# Patient Record
Sex: MALE | Race: WHITE | NOT HISPANIC OR LATINO | Employment: FULL TIME | URBAN - NONMETROPOLITAN AREA
[De-identification: names, ages, dates, MRNs, and addresses within clinical notes are randomized per-mention and may not be internally consistent; named-entity substitution may affect disease eponyms.]

---

## 2017-03-23 ENCOUNTER — OFFICE VISIT (OUTPATIENT)
Dept: RETAIL CLINIC | Facility: CLINIC | Age: 32
End: 2017-03-23

## 2017-03-23 VITALS — HEART RATE: 88 BPM | TEMPERATURE: 98.7 F | WEIGHT: 160.8 LBS | OXYGEN SATURATION: 97 % | RESPIRATION RATE: 20 BRPM

## 2017-03-23 DIAGNOSIS — Z20.828 EXPOSURE TO INFLUENZA: Primary | ICD-10-CM

## 2017-03-23 PROCEDURE — 99213 OFFICE O/P EST LOW 20 MIN: CPT | Performed by: NURSE PRACTITIONER

## 2017-03-23 RX ORDER — DEXTROMETHORPHAN HYDROBROMIDE AND PROMETHAZINE HYDROCHLORIDE 15; 6.25 MG/5ML; MG/5ML
5 SYRUP ORAL 4 TIMES DAILY PRN
Qty: 180 ML | Refills: 0 | Status: SHIPPED | OUTPATIENT
Start: 2017-03-23 | End: 2017-03-28

## 2017-03-23 RX ORDER — OSELTAMIVIR PHOSPHATE 75 MG/1
75 CAPSULE ORAL 2 TIMES DAILY
Qty: 10 CAPSULE | Refills: 0 | Status: SHIPPED | OUTPATIENT
Start: 2017-03-23 | End: 2017-03-28

## 2017-03-23 NOTE — PROGRESS NOTES
"Subjective   Steve Davidson is a 31 y.o. female.     Flu Symptoms   This is a new problem. The current episode started yesterday. The problem occurs constantly. The problem has been rapidly worsening. Associated symptoms include arthralgias, chills, congestion, coughing, fatigue, a fever, headaches, myalgias, nausea and a sore throat. Pertinent negatives include no chest pain, rash or vomiting. The symptoms are aggravated by coughing. She has tried acetaminophen and NSAIDs for the symptoms. The treatment provided no relief.      Pt presents with c/o flu symptoms which started suddenly yesterday and described as feeling like \"hit by truck\".  States 2 of his children tested + for flu this week.  Has been taking otc cold and cough meds with no relief.     No current outpatient prescriptions on file prior to visit.     No current facility-administered medications on file prior to visit.        No Known Allergies    Past Medical History:   Diagnosis Date   • Asthma        History reviewed. No pertinent surgical history.    Family History   Problem Relation Age of Onset   • Lung disease Mother    • Heart disease Father    • Autoimmune disease Other    • Diabetes Other        Social History     Social History   • Marital status: Single     Spouse name: N/A   • Number of children: N/A   • Years of education: N/A     Occupational History   • Not on file.     Social History Main Topics   • Smoking status: Never Smoker   • Smokeless tobacco: Not on file   • Alcohol use Not on file   • Drug use: Not on file   • Sexual activity: Not on file     Other Topics Concern   • Not on file     Social History Narrative   • No narrative on file       Review of Systems   Constitutional: Positive for activity change, appetite change, chills, fatigue and fever.   HENT: Positive for congestion, ear pain, postnasal drip, rhinorrhea, sinus pressure and sore throat. Negative for trouble swallowing.    Respiratory: Positive for cough and shortness of " breath. Negative for wheezing.    Cardiovascular: Negative for chest pain.   Gastrointestinal: Positive for nausea. Negative for diarrhea and vomiting.   Musculoskeletal: Positive for arthralgias, back pain (back hurting from coughing so much) and myalgias.   Skin: Negative for rash.   Neurological: Positive for headaches. Negative for dizziness.       Pulse 88  Temp 98.7 °F (37.1 °C) (Oral)   Resp 20  Wt 160 lb 12.8 oz (72.9 kg)  SpO2 97%    Objective   Physical Exam   Constitutional: She is oriented to person, place, and time. She appears well-developed and well-nourished. She is cooperative. She has a sickly appearance. No distress.   HENT:   Head: Normocephalic and atraumatic.   Right Ear: A middle ear effusion is present.   Left Ear: A middle ear effusion is present.   Nose: Mucosal edema and rhinorrhea present. Right sinus exhibits maxillary sinus tenderness and frontal sinus tenderness. Left sinus exhibits maxillary sinus tenderness and frontal sinus tenderness.   Mouth/Throat: Uvula is midline and mucous membranes are normal. Posterior oropharyngeal edema and posterior oropharyngeal erythema present. No oropharyngeal exudate or tonsillar abscesses. No tonsillar exudate.   Neck: Trachea normal and normal range of motion. Neck supple.   Cardiovascular: Normal rate, regular rhythm, S1 normal, S2 normal and normal heart sounds.    Pulmonary/Chest: Effort normal and breath sounds normal. No respiratory distress. She has no decreased breath sounds. She has no wheezes. She has no rhonchi. She has no rales.   Lymphadenopathy:     She has cervical adenopathy.   Neurological: She is alert and oriented to person, place, and time.   Skin: Skin is warm, dry and intact. No rash noted.       Assessment/Plan   Steve was seen today for flu symptoms.    Diagnoses and all orders for this visit:    Exposure to influenza    Other orders  -     oseltamivir (TAMIFLU) 75 MG capsule; Take 1 capsule by mouth 2 (Two) Times a Day  for 5 days.  -     promethazine-dextromethorphan (PROMETHAZINE-DM) 6.25-15 MG/5ML syrup; Take 5 mL by mouth 4 (Four) Times a Day As Needed for Cough (may take 10 ml if needed) for up to 5 days.    Exam findings and treatment plan discussed with patient who verbalizes understanding.  Summary of visit given to patient.

## 2017-03-23 NOTE — PATIENT INSTRUCTIONS
"Influenza, Adult  Influenza, more commonly known as \"the flu,\" is a viral infection that primarily affects the respiratory tract. The respiratory tract includes organs that help you breathe, such as the lungs, nose, and throat. The flu causes many common cold symptoms, as well as a high fever and body aches.  The flu spreads easily from person to person (is contagious). Getting a flu shot (influenza vaccination) every year is the best way to prevent influenza.  CAUSES  Influenza is caused by a virus. You can catch the virus by:  · Breathing in droplets from an infected person's cough or sneeze.  · Touching something that was recently contaminated with the virus and then touching your mouth, nose, or eyes.  RISK FACTORS  The following factors may make you more likely to get the flu:  · Not cleaning your hands frequently with soap and water or alcohol-based hand .  · Having close contact with many people during cold and flu season.  · Touching your mouth, eyes, or nose without washing or sanitizing your hands first.  · Not drinking enough fluids or not eating a healthy diet.  · Not getting enough sleep or exercise.  · Being under a high amount of stress.  · Not getting a yearly (annual) flu shot.  You may be at a higher risk of complications from the flu, such as a severe lung infection (pneumonia), if you:  · Are over the age of 65.  · Are pregnant.  · Have a weakened disease-fighting system (immune system). You may have a weakened immune system if you:    Have HIV or AIDS.    Are undergoing chemotherapy.    Are taking medicines that reduce the activity of (suppress) the immune system.  · Have a long-term (chronic) illness, such as heart disease, kidney disease, diabetes, or lung disease.  · Have a liver disorder.  · Are obese.  · Have anemia.  SYMPTOMS  Symptoms of this condition typically last 4-10 days and may include:  · Fever.  · Chills.  · Headache, body aches, or muscle aches.  · Sore " throat.  · Cough.  · Runny or congested nose.  · Chest discomfort and cough.  · Poor appetite.  · Weakness or tiredness (fatigue).  · Dizziness.  · Nausea or vomiting.  DIAGNOSIS  This condition may be diagnosed based on your medical history and a physical exam. Your health care provider may do a nose or throat swab test to confirm the diagnosis.  TREATMENT  If influenza is detected early, you can be treated with antiviral medicine that can reduce the length of your illness and the severity of your symptoms. This medicine may be given by mouth (orally) or through an IV tube that is inserted in one of your veins.  The goal of treatment is to relieve symptoms by taking care of yourself at home. This may include taking over-the-counter medicines, drinking plenty of fluids, and adding humidity to the air in your home.  In some cases, influenza goes away on its own. Severe influenza or complications from influenza may be treated in a hospital.  HOME CARE INSTRUCTIONS  · Take over-the-counter and prescription medicines only as told by your health care provider.  · Use a cool mist humidifier to add humidity to the air in your home. This can make breathing easier.  · Rest as needed.  · Drink enough fluid to keep your urine clear or pale yellow.  · Cover your mouth and nose when you cough or sneeze.  · Wash your hands with soap and water often, especially after you cough or sneeze. If soap and water are not available, use hand .  · Stay home from work or school as told by your health care provider. Unless you are visiting your health care provider, try to avoid leaving home until your fever has been gone for 24 hours without the use of medicine.  · Keep all follow-up visits as told by your health care provider. This is important.  PREVENTION  · Getting an annual flu shot is the best way to avoid getting the flu. You may get the flu shot in late summer, fall, or winter. Ask your health care provider when you should  get your flu shot.  · Wash your hands often or use hand  often.  · Avoid contact with people who are sick during cold and flu season.  · Eat a healthy diet, drink plenty of fluids, get enough sleep, and exercise regularly.  SEEK MEDICAL CARE IF:  · You develop new symptoms.  · You have:    Chest pain.    Diarrhea.    A fever.  · Your cough gets worse.  · You produce more mucus.  · You feel nauseous or you vomit.  SEEK IMMEDIATE MEDICAL CARE IF:  · You develop shortness of breath or difficulty breathing.  · Your skin or nails turn a bluish color.  · You have severe pain or stiffness in your neck.  · You develop a sudden headache or sudden pain in your face or ear.  · You cannot stop vomiting.     This information is not intended to replace advice given to you by your health care provider. Make sure you discuss any questions you have with your health care provider.     Document Released: 12/15/2001 Document Revised: 01/13/2017 Document Reviewed: 10/11/2016  [employer] Interactive Patient Education ©2016 [employer]

## 2017-04-10 PROCEDURE — 99283 EMERGENCY DEPT VISIT LOW MDM: CPT

## 2017-04-11 ENCOUNTER — APPOINTMENT (OUTPATIENT)
Dept: GENERAL RADIOLOGY | Facility: HOSPITAL | Age: 32
End: 2017-04-11

## 2017-04-11 ENCOUNTER — HOSPITAL ENCOUNTER (EMERGENCY)
Facility: HOSPITAL | Age: 32
Discharge: HOME | End: 2017-04-11
Attending: EMERGENCY MEDICINE | Admitting: EMERGENCY MEDICINE

## 2017-04-11 VITALS
TEMPERATURE: 98.3 F | SYSTOLIC BLOOD PRESSURE: 125 MMHG | HEART RATE: 98 BPM | HEIGHT: 69 IN | BODY MASS INDEX: 22.96 KG/M2 | DIASTOLIC BLOOD PRESSURE: 78 MMHG | OXYGEN SATURATION: 99 % | WEIGHT: 155 LBS | RESPIRATION RATE: 19 BRPM

## 2017-04-11 DIAGNOSIS — S92.501A: Primary | ICD-10-CM

## 2017-04-11 PROCEDURE — 73660 X-RAY EXAM OF TOE(S): CPT

## 2017-04-11 PROCEDURE — 73660 X-RAY EXAM OF TOE(S): CPT | Performed by: RADIOLOGY

## 2017-04-11 RX ORDER — HYDROCODONE BITARTRATE AND ACETAMINOPHEN 5; 325 MG/1; MG/1
1 TABLET ORAL ONCE
Status: COMPLETED | OUTPATIENT
Start: 2017-04-11 | End: 2017-04-11

## 2017-04-11 RX ORDER — HYDROCODONE BITARTRATE AND ACETAMINOPHEN 5; 325 MG/1; MG/1
1 TABLET ORAL EVERY 6 HOURS PRN
Qty: 6 TABLET | Refills: 0 | Status: SHIPPED | OUTPATIENT
Start: 2017-04-11

## 2017-04-11 RX ADMIN — HYDROCODONE BITARTRATE AND ACETAMINOPHEN 1 TABLET: 5; 325 TABLET ORAL at 01:18

## 2017-04-11 RX ADMIN — HYDROCODONE BITARTRATE AND ACETAMINOPHEN 1 TABLET: 5; 325 TABLET ORAL at 02:13

## 2017-04-11 NOTE — ED PROVIDER NOTES
Subjective   Patient is a 31 y.o. female presenting with lower extremity pain.   History provided by:  Patient   used: No    Lower Extremity Issue   Location:  Toe (right 5th toe)  Time since incident:  4 hours  Injury: yes    Mechanism of injury: fall    Mechanism of injury comment:  Tripped, got pinky toe caught right foot  Fall:     Entrapped after fall: no    Toe location:  R little toe  Associated symptoms: decreased ROM and numbness    Associated symptoms: no back pain, no fever, no itching and no neck pain    Risk factors: no concern for non-accidental trauma and no frequent fractures        Review of Systems   Constitutional: Negative.  Negative for fever.   HENT: Negative.    Eyes: Negative.    Respiratory: Negative.    Cardiovascular: Negative.    Gastrointestinal: Negative.    Endocrine: Negative.    Genitourinary: Negative.    Musculoskeletal: Positive for gait problem. Negative for back pain and neck pain.   Skin: Negative.  Negative for itching.   Allergic/Immunologic: Negative.    Hematological: Negative.    Psychiatric/Behavioral: Negative.    All other systems reviewed and are negative.      Past Medical History:   Diagnosis Date   • Asthma        No Known Allergies    History reviewed. No pertinent surgical history.    Family History   Problem Relation Age of Onset   • Lung disease Mother    • Heart disease Father    • Autoimmune disease Other    • Diabetes Other        Social History     Social History   • Marital status: Single     Spouse name: N/A   • Number of children: N/A   • Years of education: N/A     Social History Main Topics   • Smoking status: Never Smoker   • Smokeless tobacco: None   • Alcohol use No   • Drug use: No   • Sexual activity: Not Asked     Other Topics Concern   • None     Social History Narrative   • None           Objective   Physical Exam   Constitutional: She is oriented to person, place, and time. She appears well-developed and well-nourished.    HENT:   Head: Normocephalic and atraumatic.   Nose: Nose normal.   Mouth/Throat: Oropharynx is clear and moist.   Eyes: EOM are normal. Pupils are equal, round, and reactive to light.   Neck: Normal range of motion.   Cardiovascular: Normal rate, regular rhythm, normal heart sounds and intact distal pulses.    Pulmonary/Chest: Effort normal and breath sounds normal.   Abdominal: Soft. Bowel sounds are normal.   Musculoskeletal: She exhibits tenderness.   Right 5th toe swelling and tenderness. Tenderness base fifth metatarsal   Neurological: She is alert and oriented to person, place, and time.   Skin: Skin is warm and dry.   Psychiatric: She has a normal mood and affect. Her behavior is normal. Judgment and thought content normal.   Nursing note and vitals reviewed.      Procedures         ED Course  ED Course                  MDM  Number of Diagnoses or Management Options  Closed fracture of phalanx of right fifth toe: new and requires workup     Amount and/or Complexity of Data Reviewed  Tests in the radiology section of CPT®: reviewed and ordered    Risk of Complications, Morbidity, and/or Mortality  Presenting problems: moderate  Diagnostic procedures: moderate  Management options: moderate    Patient Progress  Patient progress: improved      Final diagnoses:   Closed fracture of phalanx of right fifth toe            Indy Castelan, APRN  04/11/17 0136

## 2018-03-19 ENCOUNTER — HOSPITAL ENCOUNTER (EMERGENCY)
Facility: HOSPITAL | Age: 33
Discharge: HOME | End: 2018-03-19
Attending: EMERGENCY MEDICINE | Admitting: EMERGENCY MEDICINE

## 2018-03-19 ENCOUNTER — APPOINTMENT (OUTPATIENT)
Dept: GENERAL RADIOLOGY | Facility: HOSPITAL | Age: 33
End: 2018-03-19

## 2018-03-19 VITALS
HEIGHT: 68 IN | SYSTOLIC BLOOD PRESSURE: 128 MMHG | RESPIRATION RATE: 14 BRPM | DIASTOLIC BLOOD PRESSURE: 80 MMHG | HEART RATE: 68 BPM | BODY MASS INDEX: 24.25 KG/M2 | TEMPERATURE: 98.5 F | WEIGHT: 160 LBS | OXYGEN SATURATION: 99 %

## 2018-03-19 DIAGNOSIS — S93.602A SPRAIN OF LEFT FOOT, INITIAL ENCOUNTER: Primary | ICD-10-CM

## 2018-03-19 PROCEDURE — 99283 EMERGENCY DEPT VISIT LOW MDM: CPT

## 2018-03-19 PROCEDURE — 73630 X-RAY EXAM OF FOOT: CPT | Performed by: RADIOLOGY

## 2018-03-19 PROCEDURE — 73630 X-RAY EXAM OF FOOT: CPT

## 2018-03-19 RX ORDER — DICLOFENAC SODIUM 75 MG/1
75 TABLET, DELAYED RELEASE ORAL 2 TIMES DAILY PRN
Qty: 20 TABLET | Refills: 0 | Status: SHIPPED | OUTPATIENT
Start: 2018-03-19

## 2018-03-19 NOTE — ED PROVIDER NOTES
Subjective     History provided by:  Patient   used: No    Lower Extremity Issue   Location:  Foot  Time since incident:  1 day  Injury: yes    Mechanism of injury: fall    Mechanism of injury comment:  Fall on trampoline  Fall:     Impact surface: trampoline.  Foot location:  L foot  Pain details:     Quality:  Throbbing  Chronicity:  New  Dislocation: no    Foreign body present:  No foreign bodies  Tetanus status:  Up to date  Prior injury to area:  No  Relieved by:  Nothing  Worsened by:  Nothing  Ineffective treatments:  None tried  Associated symptoms: decreased ROM    Associated symptoms: no fever, no neck pain and no numbness    Risk factors: no concern for non-accidental trauma, no frequent fractures and no known bone disorder        Review of Systems   Constitutional: Negative for fever.   Musculoskeletal: Positive for gait problem. Negative for neck pain.   All other systems reviewed and are negative.      Past Medical History:   Diagnosis Date   • Asthma        No Known Allergies    No past surgical history on file.    Family History   Problem Relation Age of Onset   • Lung disease Mother    • Heart disease Father    • Autoimmune disease Other    • Diabetes Other        Social History     Social History   • Marital status: Single     Social History Main Topics   • Smoking status: Never Smoker   • Alcohol use No   • Drug use: No     Other Topics Concern   • Not on file           Objective   Physical Exam   Constitutional: He is oriented to person, place, and time. He appears well-developed and well-nourished.   Eyes: Pupils are equal, round, and reactive to light.   Cardiovascular: Normal rate, regular rhythm, normal heart sounds and intact distal pulses.    Pulmonary/Chest: Effort normal and breath sounds normal.   Abdominal: Soft. Bowel sounds are normal.   Musculoskeletal: He exhibits tenderness.   Tenderness to top of left foot. Limited ROM related to pain   Neurological: He is  alert and oriented to person, place, and time.   Skin: Skin is warm.   Psychiatric: He has a normal mood and affect. His behavior is normal. Judgment and thought content normal.   Nursing note and vitals reviewed.      Procedures         ED Course  ED Course                  MDM  Number of Diagnoses or Management Options  Sprain of left foot, initial encounter: new and requires workup     Amount and/or Complexity of Data Reviewed  Tests in the radiology section of CPT®: reviewed and ordered    Risk of Complications, Morbidity, and/or Mortality  Presenting problems: low  Diagnostic procedures: low  Management options: low    Patient Progress  Patient progress: improved      Final diagnoses:   Sprain of left foot, initial encounter            Indy Castelan, JASWINDER  03/19/18 0844       Indy Castelan, JASWINDER  03/19/18 0848

## 2018-03-19 NOTE — ED NOTES
"Patient reports injury happened yesterday evening around 1900 when he was jumping on a trampoline. Pt reports he got too close to the edge and states \"my left foot buckled underneath me\". Pt reports increased pain since injury happened. No obvious deformity noted. Neurovascular status distal to area of injury remains intact with positive pedal pulse and capillary refill being less than 3 seconds. No discoloration of left foot noted.      Job Echavarria RN  03/19/18 0758    "

## 2019-04-19 ENCOUNTER — TRANSCRIBE ORDERS (OUTPATIENT)
Dept: ADMINISTRATIVE | Facility: HOSPITAL | Age: 34
End: 2019-04-19

## 2019-04-19 ENCOUNTER — LAB (OUTPATIENT)
Dept: LAB | Facility: HOSPITAL | Age: 34
End: 2019-04-19

## 2019-04-19 DIAGNOSIS — F64.0 GENDER DYSPHORIA IN ADULT: ICD-10-CM

## 2019-04-19 DIAGNOSIS — F64.0 GENDER DYSPHORIA IN ADULT: Primary | ICD-10-CM

## 2019-04-19 LAB
CHOLEST SERPL-MCNC: 120 MG/DL (ref 0–200)
DEPRECATED RDW RBC AUTO: 44.6 FL (ref 37–54)
ERYTHROCYTE [DISTWIDTH] IN BLOOD BY AUTOMATED COUNT: 13.9 % (ref 12.3–15.4)
HCT VFR BLD AUTO: 44.7 % (ref 37.5–51)
HDLC SERPL-MCNC: 35 MG/DL (ref 40–60)
HGB BLD-MCNC: 14.5 G/DL (ref 13–17.7)
LDLC SERPL CALC-MCNC: 58 MG/DL (ref 0–100)
LDLC/HDLC SERPL: 1.65 {RATIO}
MCH RBC QN AUTO: 28.8 PG (ref 26.6–33)
MCHC RBC AUTO-ENTMCNC: 32.4 G/DL (ref 31.5–35.7)
MCV RBC AUTO: 88.7 FL (ref 79–97)
PLATELET # BLD AUTO: 359 10*3/MM3 (ref 140–450)
PMV BLD AUTO: 9.7 FL (ref 6–12)
RBC # BLD AUTO: 5.04 10*6/MM3 (ref 4.14–5.8)
TESTOST SERPL-MCNC: 510 NG/DL (ref 249–836)
TRIGL SERPL-MCNC: 137 MG/DL (ref 0–150)
VLDLC SERPL-MCNC: 27.4 MG/DL (ref 5–40)
WBC NRBC COR # BLD: 11.94 10*3/MM3 (ref 3.4–10.8)

## 2019-04-19 PROCEDURE — 84403 ASSAY OF TOTAL TESTOSTERONE: CPT

## 2019-04-19 PROCEDURE — 85027 COMPLETE CBC AUTOMATED: CPT

## 2019-04-19 PROCEDURE — 80061 LIPID PANEL: CPT

## 2019-04-19 PROCEDURE — 36415 COLL VENOUS BLD VENIPUNCTURE: CPT

## 2019-08-12 ENCOUNTER — APPOINTMENT (OUTPATIENT)
Dept: GENERAL RADIOLOGY | Facility: HOSPITAL | Age: 34
End: 2019-08-12

## 2019-08-12 ENCOUNTER — HOSPITAL ENCOUNTER (EMERGENCY)
Facility: HOSPITAL | Age: 34
Discharge: HOME | End: 2019-08-12
Attending: FAMILY MEDICINE | Admitting: FAMILY MEDICINE

## 2019-08-12 VITALS
SYSTOLIC BLOOD PRESSURE: 127 MMHG | DIASTOLIC BLOOD PRESSURE: 84 MMHG | TEMPERATURE: 97.7 F | RESPIRATION RATE: 16 BRPM | HEART RATE: 67 BPM | WEIGHT: 155 LBS | HEIGHT: 68 IN | BODY MASS INDEX: 23.49 KG/M2 | OXYGEN SATURATION: 97 %

## 2019-08-12 DIAGNOSIS — S90.31XA CONTUSION OF RIGHT FOOT, INITIAL ENCOUNTER: Primary | ICD-10-CM

## 2019-08-12 PROCEDURE — 99283 EMERGENCY DEPT VISIT LOW MDM: CPT

## 2019-08-12 PROCEDURE — 73610 X-RAY EXAM OF ANKLE: CPT | Performed by: RADIOLOGY

## 2019-08-12 PROCEDURE — 73610 X-RAY EXAM OF ANKLE: CPT

## 2019-08-12 RX ORDER — HYDROCODONE BITARTRATE AND ACETAMINOPHEN 5; 325 MG/1; MG/1
1 TABLET ORAL ONCE
Status: COMPLETED | OUTPATIENT
Start: 2019-08-12 | End: 2019-08-12

## 2019-08-12 RX ADMIN — HYDROCODONE BITARTRATE AND ACETAMINOPHEN 1 TABLET: 5; 325 TABLET ORAL at 22:06

## 2019-08-13 NOTE — ED NOTES
Pt ankle stabilized with ace bandage. No change in pt pain level due to meds given just priot to discharge. VS stable. Pt verbalizes importance of follow-up care.     Lesly Redman RN  08/12/19 [id number]

## 2019-08-13 NOTE — ED PROVIDER NOTES
Subjective     Ankle Pain   Location:  Ankle  Injury: yes    Mechanism of injury: fall    Ankle location:  R ankle  Pain details:     Quality:  Aching and throbbing    Radiates to:  Does not radiate    Severity:  Moderate    Onset quality:  Sudden    Timing:  Constant    Progression:  Unchanged  Chronicity:  New  Dislocation: no    Tetanus status:  Up to date  Prior injury to area:  No  Relieved by:  None tried  Worsened by:  Nothing  Ineffective treatments:  Acetaminophen  Associated symptoms: decreased ROM    Associated symptoms: no fever        Review of Systems   Constitutional: Negative.  Negative for fever.   HENT: Negative.    Respiratory: Negative.    Cardiovascular: Negative.  Negative for chest pain.   Gastrointestinal: Negative.  Negative for abdominal pain.   Endocrine: Negative.    Genitourinary: Negative.  Negative for dysuria.   Skin: Negative.    Neurological: Negative.    Psychiatric/Behavioral: Negative.    All other systems reviewed and are negative.      Past Medical History:   Diagnosis Date   • Asthma        No Known Allergies    No past surgical history on file.    Family History   Problem Relation Age of Onset   • Lung disease Mother    • Heart disease Father    • Autoimmune disease Other    • Diabetes Other        Social History     Socioeconomic History   • Marital status: Single     Spouse name: Not on file   • Number of children: Not on file   • Years of education: Not on file   • Highest education level: Not on file   Tobacco Use   • Smoking status: Never Smoker   Substance and Sexual Activity   • Alcohol use: No   • Drug use: No           Objective   Physical Exam   Constitutional: He is oriented to person, place, and time. He appears well-developed and well-nourished. No distress.   HENT:   Head: Normocephalic and atraumatic.   Right Ear: External ear normal.   Left Ear: External ear normal.   Nose: Nose normal.   Eyes: Conjunctivae and EOM are normal. Pupils are equal, round, and  reactive to light.   Neck: Normal range of motion. Neck supple. No JVD present. No tracheal deviation present.   Cardiovascular: Normal rate, regular rhythm and normal heart sounds.   No murmur heard.  Pulmonary/Chest: Effort normal and breath sounds normal. No respiratory distress. He has no wheezes.   Abdominal: Soft. Bowel sounds are normal. There is no tenderness.   Musculoskeletal: Normal range of motion. He exhibits no edema or deformity.        Right ankle: Tenderness.   Neurological: He is alert and oriented to person, place, and time. No cranial nerve deficit.   Skin: Skin is warm and dry. No rash noted. He is not diaphoretic. No erythema. No pallor.   Psychiatric: He has a normal mood and affect. His behavior is normal. Thought content normal.   Nursing note and vitals reviewed.      Procedures           ED Course                  MDM  Number of Diagnoses or Management Options  Contusion of right foot, initial encounter: new and does not require workup     Amount and/or Complexity of Data Reviewed  Tests in the radiology section of CPT®: reviewed          Final diagnoses:   Contusion of right foot, initial encounter            Breanna Hou, APRN  08/12/19 1290

## 2022-01-13 ENCOUNTER — HOSPITAL ENCOUNTER (OUTPATIENT)
Dept: HOSPITAL 79 - LAB | Age: 37
End: 2022-01-13
Attending: NURSE PRACTITIONER
Payer: COMMERCIAL

## 2022-01-13 DIAGNOSIS — F64.9: Primary | ICD-10-CM

## 2022-01-13 LAB
HGB BLD-MCNC: 16.1 GM/DL (ref 14–17.5)
RED BLOOD COUNT: 5.67 M/UL (ref 4.2–5.5)
WHITE BLOOD COUNT: 9.7 K/UL (ref 4.5–11)

## 2022-01-14 LAB
HDL CHOLESTEROL: 42 MG/DL (ref 39–?)
LDL CHOLESTEROL CALC: 112 MG/DL (ref 0–99)
LDL/HDL RATIO: 2.7 RATIO (ref 0–3.6)
T. CHOL/HDL RATIO: 4.1 RATIO (ref 0–5)
TESTOSTERONE, SERUM: 266 NG/DL (ref 264–916)
TRIGLYCERIDES: 108 MG/DL (ref 0–149)

## 2022-06-10 ENCOUNTER — TRANSCRIBE ORDERS (OUTPATIENT)
Dept: PHYSICAL THERAPY | Facility: CLINIC | Age: 37
End: 2022-06-10

## 2022-06-10 DIAGNOSIS — R53.1 WEAKNESS: Primary | ICD-10-CM

## 2022-06-15 ENCOUNTER — TREATMENT (OUTPATIENT)
Dept: PHYSICAL THERAPY | Facility: CLINIC | Age: 37
End: 2022-06-15

## 2022-06-15 DIAGNOSIS — R53.1 WEAKNESS: Primary | ICD-10-CM

## 2022-06-15 PROCEDURE — 97162 PT EVAL MOD COMPLEX 30 MIN: CPT | Performed by: PHYSICAL THERAPIST

## 2022-06-15 PROCEDURE — 97116 GAIT TRAINING THERAPY: CPT | Performed by: PHYSICAL THERAPIST

## 2022-06-15 NOTE — PROGRESS NOTES
"    Physical Therapy Initial Evaluation and Plan of Care    Patient: Steve Davidson   : 1985  Diagnosis/ICD-10 Code:  Weakness [R53.1]  Referring practitioner: Radha Moss DO  Date of Initial Visit: 6/15/2022  Today's Date: 6/15/2022  Patient seen for 1 session         Visit Diagnoses:    ICD-10-CM ICD-9-CM   1. Weakness  R53.1 780.79         Objective Measures: FOWLER/56      Subjective Evaluation    History of Present Illness  Date of onset: 2022  Mechanism of injury: Patient reports that on 2022, due to loss of use in the right UE.  He states that he went to ER, but while he was waiting he began experiencing weakness and his right LE was \"drawing up.\"  He also notes that he was unable to move his left LE.  He states they initially thought he was having a CVA, but the CVA was eventually ruled out.  He states that MD's have considered hemiplegic migraines or autoimmune disease as possible causes for the weakness.  He currently continues to report weakness in the right LE; he also notes difficulty with ambulation and decreased balance.        Patient Occupation:    Precautions and Work Restrictions: Currently off workPain  Current pain ratin  Location: Back    Social Support  Lives in: trailer (5 steps to enter with 2 HR)  Lives with: significant other    Hand dominance: right    Patient Goals  Patient goals for therapy: improved balance and increased strength             Objective          Neurological Testing     Sensation     Lumbar   Left   Intact: light touch    Right   Diminished: light touch    Reflexes   Left   Patellar (L4): normal (2+)  Achilles (S1): normal (2+)    Right   Patellar (L4): normal (2+)  Achilles (S1): normal (2+)    Strength/Myotome Testing     Left Hip   Planes of Motion   Flexion: 4+    Right Hip   Planes of Motion   Flexion: 4-    Left Knee   Flexion: 4+  Extension: 4+    Right Knee   Flexion: 4  Extension: 3-    Left Ankle/Foot   Dorsiflexion: " 4+  Plantar flexion: 4+    Right Ankle/Foot   Dorsiflexion: 3-  Plantar flexion: 3+    Ambulation     Observational Gait   Decreased right stance time and right step length.     Quality of Movement During Gait     Additional Quality of Movement During Gait Details  Decreased foot clearance on right LE during ambulation.  Patient arrived ambulating with no AD; education provided on ambulation with RW.          Assessment & Plan     Assessment  Impairments: abnormal gait, activity intolerance, impaired balance, impaired physical strength, lacks appropriate home exercise program, pain with function, safety issue and weight-bearing intolerance  Functional Limitations: lifting, walking, pulling, pushing, moving in bed, standing, stooping and unable to perform repetitive tasks  Assessment details: Patient is a 36 year old male who comes to physical therapy for weakness. The patient presents with decreased LE strength, decreased balance, impaired gait, and decreased endurance. Patient is at increased risk for falls, which is evident based on a score of 30/56 on the FOWLER.  Patient was unable to perform SLS or tandem stance, with patient requiring assistance to prevent fall.  Patient will benefit from skilled PT, so that patient can achieve maximum level of function.     Prognosis: good    Goals  Plan Goals: Short Term Goals: 4 weeks  1. Pt will perform bilateral SLS for 5 seconds for improved balance.  2. Pt will score at least 36/56 on the Fowler Balance Scale for decreased risk of fall.  3. Pt will perform sit<>stand transfers with minimal use of upper extremities to allow for improved independence.    Long Term Goals: 12 weeks  1. Pt will perform bilateral SLS for 10 seconds for improved balance.  2. Pt will score at least 42/56 on the Fowler Balance Scale for decreased risk of fall.  3. Bilateral LE strength will improve to at least 4/5 for improved safety with ambulation on unlevel surfaces.  4. Patient will be able to  achieve tandem stance and maintain balance for 30 seconds to show improved balance.  5. Patient will ambulate with least restrictive assistive device and improved foot clearance for improved safety with ambulation.        Plan  Therapy options: will be seen for skilled therapy services  Planned modality interventions: cryotherapy, thermotherapy (hydrocollator packs) and electrical stimulation/Russian stimulation  Planned therapy interventions: manual therapy, balance/weight-bearing training, neuromuscular re-education, body mechanics training, postural training, soft tissue mobilization, flexibility, spinal/joint mobilization, functional ROM exercises, strengthening, gait training, stretching, home exercise program, therapeutic activities, IADL retraining, joint mobilization and transfer training  Frequency: 3x week  Duration in weeks: 12  Treatment plan discussed with: patient  Plan details: High Evaluation   89240  Re-evaluation   51596    Therapeutic exercise  05697  Therapeutic activity    64724  Neuromuscular re-education   16057  Manual therapy   67698  Gait training  84897    Attended e-stim  00504  Moist heat/cryotherapy 57201             History # of Personal Factors and/or Comorbidities: HIGH (3+)  Examination of Body System(s): # of elements: HIGH (4+)  Clinical Presentation: EVOLVING  Clinical Decision Making: HIGH      Timed:         Manual Therapy:         mins  13239;     Therapeutic Exercise:         mins  48832;     Neuromuscular Fatuma:        mins  87490;    Therapeutic Activity:          mins  32646;     Gait Trainin     mins  73981;     Ultrasound:          mins  13660;    Ionto                                   mins   83980  Self Care                            mins   99589  Canalith Repos         mins 40929      Un-Timed:  Electrical Stimulation:         mins  11628 ( );  Dry Needling          mins self-pay  Traction          mins 48688  Low Eval          Mins  60641  Mod Eval           Mins  86982  High Eval                       45     Mins  06117        Timed Treatment:   11   mins   Total Treatment:     45   mins          PT: Meagan Mendoza, DEREK     License Number: [id number]  Electronically signed by Meagan Mendoza, PT, 06/15/22, 9:38 AM EDT    Certification Period: 6/15/2022 thru 9/12/2022  I certify that the therapy services are furnished while this patient is under my care.  The services outlined above are required by this patient, and will be reviewed every 90 days.         Physician Signature:__________________________________________________    PHYSICIAN: Radha Moss DO  NPI: [id number]                                      DATE:      Please sign and return via fax to [email or web address] . Thank you, [facility].

## 2022-06-16 ENCOUNTER — TREATMENT (OUTPATIENT)
Dept: PHYSICAL THERAPY | Facility: CLINIC | Age: 37
End: 2022-06-16

## 2022-06-16 DIAGNOSIS — R53.1 WEAKNESS: ICD-10-CM

## 2022-06-16 DIAGNOSIS — R29.898 UPPER EXTREMITY WEAKNESS: Primary | ICD-10-CM

## 2022-06-16 DIAGNOSIS — R27.9 LACK OF COORDINATION: ICD-10-CM

## 2022-06-16 DIAGNOSIS — M25.641 JOINT STIFFNESS OF HAND, RIGHT: ICD-10-CM

## 2022-06-16 PROCEDURE — 97167 OT EVAL HIGH COMPLEX 60 MIN: CPT | Performed by: OCCUPATIONAL THERAPIST

## 2022-06-16 NOTE — PROGRESS NOTES
Occupational Therapy Initial Evaluation and Plan of Care    Patient: Steve Davidson   : 1985  Diagnosis/ICD-10 Code:  Upper extremity weakness [R29.898]  Referring practitioner: Radha Moss DO  Date of Initial Visit: 2022  Today's Date: 2022  Patient seen for 1 session         Visit Diagnoses:    ICD-10-CM ICD-9-CM   1. Upper extremity weakness  R29.898 729.89   2. Joint stiffness of hand, right  M25.641 719.54   3. Weakness  R53.1 780.79   4. Lack of coordination  R27.9 781.3         Subjective Questionnaire:   QuickDASH: 47.7 / 100 = 47.7 %  Box and Blocks:   Left=40   Right=37  9-Hole-Peg:   Left=26.38   Right=30.69  Kapandji Opposition Scale: 10    Subjective Evaluation    History of Present Illness  Date of onset: 2022  Mechanism of injury: Pt reports sudden loss of right UE strength resulting in an ER visit and a brief hospital stay from 22-22. Pt states that initally CVA was consider but then ruled out and hemiplegic migraines were considered.     Subjective comment: Pt reports right (dominate) side weakness  Patient Occupation: Factory indoor     Precautions and Work Restrictions: Currently off workPain  No pain reported    Social Support  Lives in: trailer  Lives with: significant other    Hand dominance: right    Treatments  Current treatment: physical therapy  Discharged from (in last 30 days): inpatient hospitalization  Patient Goals  Patient goals for therapy: improved balance, increased motion, increased strength and independence with ADLs/IADLs             Objective          Observations     Left Wrist/Hand   Positive for trophic changes.     Right Wrist/Hand   Positive for trophic changes.     Additional Wrist/Hand Observation Details  Some skin peeling on bilateral volar side of hand; pt reports right hand often feels colder than left.     Neurological Testing     Additional Neurological Details  Pt reports right whole hand numbness at  times    Active Range of Motion   Left Shoulder   Normal active range of motion    Right Shoulder   Normal active range of motion    Left Elbow   Normal active range of motion    Right Elbow   Normal active range of motion    Left Wrist   Normal active range of motion    Right Wrist   Normal active range of motion    Additional Active Range of Motion Details  Right: Normal AROM range but slow stiff movement    Passive Range of Motion   Left Shoulder   Normal passive range of motion    Right Shoulder   Normal passive range of motion    Strength/Myotome Testing     Left Shoulder   Normal muscle strength    Right Shoulder     Planes of Motion   Flexion: 3   Extension: 3   Abduction: 3   Adduction: 3     Left Elbow   Normal strength    Right Elbow   Flexion: 3  Extension: 3    Left Wrist/Hand   Normal wrist strength     (2nd hand position)     Trial 1: 79 lbs    Trial 2: 73 lbs    Trial 3: 70 lbs    Average: 74 lbs  Left  strength (2nd hand position) 74 lbs    Right Wrist/Hand   Wrist extension: 3  Wrist flexion: 3     (2nd hand position)     Trial 1: 46 lbs    Trial 2: 40 lbs    Trial 3: 40 lbs    Average: 42 lbs  Right  strength (2nd hand position) 42 lbs    Tests     Right Wrist/Hand   Negative extrinsic extensor tightness, extrinsic flexor tightness and intrinsic muscle tightness.     Ambulation   Weight-Bearing Status   Assistive device used: rollator walker with seat          Assessment & Plan     Assessment  Impairments: abnormal coordination, abnormal muscle tone, abnormal or restricted ROM, activity intolerance, impaired balance, impaired physical strength, lacks appropriate home exercise program and safety issue    Assessment details: Pt referred to OT due to weakness following recent hospital stay with sudden onset of right side weakness. Pt reports that CVA was ruled out and hemiplegic migraines was suspected as a possible cause but ultimately not confirmed. Pt presents to OT with some  unsteadiness with standing and gait while currently using a Rolator walker. Pt presents with increased weakness of right UE as well as increased hand stiffness with delay and decreased fluidity of finger movement. Pt reports being right hand dominate with no prior right UE and right side functional impairments. Pt scored 47.7% on the QuckDASH indicating a moderate to severe disability. Pt presents highly motivated and adherent. Pt will benefit from OT treatment to address functional limitations and return to prior level of independence. Without OT treatment, Pt is at a greater risk of injury and continued functional decline.    Prognosis: good    Goals  Plan Goals: OT Short Term Goals  STG Date to Achieve 07/14/22  STG 1 Pt will demonstrate good return on beginning HEP  STG 2 Pt will improve right grasp strength by 20 pounds to improve ADL independence.  STG 3 Pt will improve right UE fine motor skills with 5 second improvement on 9-hole peg test to improve ADL independence  STG 4 Pt will right UE strength to 4/5 MMT for increased ADL independence  STG 5 Pt will improve QuickDASH score by 20 point to improve ADL independence    Long Term Goals  LTG Date to Achieve 09/08/22  LTG 1 Pt will demonstrate good return on beginning HEP  LTG 2 Pt will improve right grasp strength by 40 pouinds to improve ADL independence.  LTG 3 Pt will improve right UE fine motor skills with 10 second improvement on 9-hole peg test to improve ADL independence  LTG 4 Pt will right UE strength to 5/5 MMT for increased ADL independence  LTG 5 Pt will improve QuickDASH score by 50 points to improve ADL independence      Plan  Planned modality interventions: electrical stimulation/Russian stimulation  Planned therapy interventions: ADL retraining, fine motor coordination training, flexibility, functional ROM exercises, home exercise program, IADL retraining, joint mobilization, manual therapy, motor coordination training, neuromuscular  re-education, orthotic fitting/training, strengthening, stretching, therapeutic activities and transfer training  Frequency: 2x week  Duration in weeks: 12  Treatment plan discussed with: patient            93316 - OT Evaluation Minutes, High                       47     Mins      Total Treatment:     47   mins          OT: Kenny D Maynes, OTR/L, CHT     [place] License #[id number]  NPI #[id number]  Electronically signed by: Kenny D. Maynes, OTR/L, CHT 6/16/2022      Certification Period: 6/16/2022 thru 9/13/2022  I certify that the therapy services are furnished while this patient is under my care.  The services outlined above are required by this patient, and will be reviewed every 90 days.         Physician Signature:__________________________________________________    PHYSICIAN: Radha Moss DO  NPI: [id number]                                      DATE:      Please sign and return via fax to [email or web address] . Thank you, [facility].

## 2022-06-21 ENCOUNTER — TREATMENT (OUTPATIENT)
Dept: PHYSICAL THERAPY | Facility: CLINIC | Age: 37
End: 2022-06-21

## 2022-06-21 DIAGNOSIS — R53.1 WEAKNESS: Primary | ICD-10-CM

## 2022-06-21 PROCEDURE — 97110 THERAPEUTIC EXERCISES: CPT | Performed by: PHYSICAL THERAPIST

## 2022-06-21 PROCEDURE — 97112 NEUROMUSCULAR REEDUCATION: CPT | Performed by: PHYSICAL THERAPIST

## 2022-06-21 NOTE — PROGRESS NOTES
Physical Therapy Daily Treatment Note      Patient: Steve Davidson   : 1985  Referring practitioner: Radha Moss DO  Date of Initial Visit: Type: THERAPY  Noted: 6/15/2022  Today's Date: 2022  Patient seen for 2 sessions       Visit Diagnoses:    ICD-10-CM ICD-9-CM   1. Weakness  R53.1 780.79       Subjective Evaluation    History of Present Illness    Subjective comment: Patient states that he feels like his balance is improving some.  He notes that he is also noticing less numbness in the right LE.  Patient states that he has been using his rollator for safety when walking.Pain  No pain reported           Objective   See Exercise, Manual, and Modality Logs for complete treatment.       Assessment & Plan     Assessment    Assessment details: Therapy session consisted of there ex and neuromuscular re-education.  Rest breaks were provided as necessary throughout session.  There ex focused on lower extremity strengthening, with patient performing exercises in seated position.  Patient challenged with balance tasks, with patient requiring intermittent UE support and CGA-min assist to help maintain balance.  Patient showed greatest difficulty with semi-tandem stance and feet together balance with eyes closed.  Patient will continue to be progressed per his tolerance and POC.          Timed:         Manual Therapy:         mins  41014;     Therapeutic Exercise:    25     mins  34412;     Neuromuscular Fatuma:  18      mins  93317;    Therapeutic Activity:          mins  34735;     Gait Training:           mins  81855;     Ultrasound:          mins  68773;    Ionto                                   mins   80215  Self Care                            mins   97467  Canalith Repos         mins 01594      Un-Timed:  Electrical Stimulation:         mins  71807 (MC );  Dry Needling          mins self-pay  Traction          mins 69997      Timed Treatment:   43   mins   Total Treatment:     43    mins    Meagan Mendoza, DEREK  [place] License: [id number]  Electronically signed by Meagan Mendoza PT, 06/21/22, 10:35 AM EDT.

## 2022-06-23 ENCOUNTER — TREATMENT (OUTPATIENT)
Dept: PHYSICAL THERAPY | Facility: CLINIC | Age: 37
End: 2022-06-23

## 2022-06-23 DIAGNOSIS — R27.9 LACK OF COORDINATION: ICD-10-CM

## 2022-06-23 DIAGNOSIS — R29.898 UPPER EXTREMITY WEAKNESS: ICD-10-CM

## 2022-06-23 DIAGNOSIS — R53.1 WEAKNESS: Primary | ICD-10-CM

## 2022-06-23 DIAGNOSIS — M25.641 JOINT STIFFNESS OF HAND, RIGHT: ICD-10-CM

## 2022-06-23 PROCEDURE — 97110 THERAPEUTIC EXERCISES: CPT | Performed by: OCCUPATIONAL THERAPIST

## 2022-06-23 PROCEDURE — 97112 NEUROMUSCULAR REEDUCATION: CPT | Performed by: PHYSICAL THERAPIST

## 2022-06-23 PROCEDURE — 97110 THERAPEUTIC EXERCISES: CPT | Performed by: PHYSICAL THERAPIST

## 2022-06-23 PROCEDURE — 97530 THERAPEUTIC ACTIVITIES: CPT | Performed by: OCCUPATIONAL THERAPIST

## 2022-06-23 PROCEDURE — 97112 NEUROMUSCULAR REEDUCATION: CPT | Performed by: OCCUPATIONAL THERAPIST

## 2022-06-23 NOTE — PROGRESS NOTES
OccupationalTherapy Daily Progress Note        Patient: Steve Davidson   : 1985  Diagnosis/ICD-10 Code:  Weakness [R53.1]  Referring practitioner: Radha Moss DO  Date of Initial Visit: Type: THERAPY  Noted: 2022  Today's Date: 2022  Patient seen for 2 sessions    Visit Diagnoses:    ICD-10-CM ICD-9-CM   1. Weakness  R53.1 780.79   2. Upper extremity weakness  R29.898 729.89   3. Joint stiffness of hand, right  M25.641 719.54   4. Lack of coordination  R27.9 781.3          Steve Davidson reports: tingling right lateral dorsal hand (digits 3,4,5) and forearm; an episode of significant increase in weakness yesterday but feeling better now.         Subjective Evaluation    Pain  No pain reported    Patient Goals  Patient goals for therapy: increased motion, increased strength and independence with ADLs/IADLs           Objective   See Exercise, Manual, and Modality Logs for complete treatment.       Therapy Education 22 1000       Access Code: [email or web address]  URL: [email or web address]  Date: 2022  Prepared by: Kenny Maynes      Exercises  Standing Radial Nerve Glide - 1 x daily - 7 x weekly - 3 sets - 10 reps  Radial Nerve Mobilization - 1 x daily - 7 x weekly - 3 sets - 10 reps     Given HEP   Program New   How Provided Verbal;Demonstration;Written   Provided to Patient   Level of Understanding Teach back education performed;Verbalized;Demonstrated       Assessment & Plan     Assessment  Impairments: abnormal coordination, abnormal or restricted ROM, activity intolerance, impaired physical strength, lacks appropriate home exercise program and safety issue    Assessment details: Pt engaged in reciprocal overhead pulleys with BUE requiring a brief rest break at the 8 minute john out of 15 minutes. OT provided cues for pacing and techniques to facilitate improved strength and tolerance. Pt completed graded clothes pin pinch exercise x 2 sets with right UE to improve strength, tolerance,  and coordination. Pt completed with minimal extra time required.  Pt completed right UE radial nerve glides in hope at improving right UE c/o sensation changes including tingling.   Prognosis: good  Prognosis details: Pt is making good progress with OT treatment and demonstrates good remaining OT potential.     Plan  Other planned modality interventions: electrical stimulation  Planned therapy interventions: manual therapy, motor coordination training, neuromuscular re-education, strengthening, therapeutic activities, joint mobilization, home exercise program, ADL retraining, fine motor coordination training, functional ROM exercises and soft tissue mobilization  Frequency: 2x week  Plan details: Continue current OT plan of care.         Progress per Plan of Care           Timed Codes        Manual Therapy:    0     mins  16848;    Therapeutic Exercise:    15     mins  22252;     Neuromuscular Fatuma:    15    mins  42584;    Therapeutic Activity:     15     mins  68573;      Ultrasound:     0     mins  01739;    Community/ work    0     mins  34439  Self Care/ Fidelina    0     mins  85270  Sensory Re-Int.             0     mins   07572  Cognitve Re-train    0     mins  37535     Un-timed Codes  Electrical Stimulation:    0     mins 9 44553 ( );      Timed Treatment:   45   mins   Total Treatment:     45   mins    Kenny D Maynes, OTR/L, ORQUIDEA  Occupational Therapist, Certified Hand Therapist  [place] License #[id number]  NPI #[id number]  Electronically signed by: Kenny D. Maynes, OTR/L, CHT 6/23/2022

## 2022-06-23 NOTE — PROGRESS NOTES
Physical Therapy Daily Treatment Note      Patient: Steve Davidson   : 1985  Referring practitioner: Radha Moss DO  Date of Initial Visit: Type: THERAPY  Noted: 6/15/2022  Today's Date: 2022  Patient seen for 3 sessions       Visit Diagnoses:    ICD-10-CM ICD-9-CM   1. Weakness  R53.1 780.79       Subjective Evaluation    History of Present Illness    Subjective comment: Patient reports that he tolerated last session well, but was tired afterwards.  He notes that he had a fall yesterday when his legs gave out on him.  Patient notes that he has no increased difficulty with weightbearing or walking.Pain  No pain reported           Objective   See Exercise, Manual, and Modality Logs for complete treatment.       Assessment & Plan     Assessment    Assessment details: Patient assessed due to reports of recent fall; patient displayed no increased difficulty with ambulation.  Patient educated to seek medical attention if he experiences any increased pain or difficulty walking due to fall; patient verbalized understanding.  Therapy session consisted of there ex and neuromuscular re-education.  There ex progressed to include increased repetitions.  Patient required occasional cues during stepping over balance pods for improved hip flexion to prevent circumduction.  He will continue to be progressed per his tolerance and POC.          Timed:         Manual Therapy:         mins  31759;     Therapeutic Exercise:    26     mins  71434;     Neuromuscular Fatuma:    20    mins  73435;    Therapeutic Activity:          mins  65058;     Gait Training:           mins  63560;     Ultrasound:          mins  36767;    Ionto                                   mins   50254  Self Care                            mins   38715  Canalith Repos         mins 13305      Un-Timed:  Electrical Stimulation:         mins  79880 ( );  Dry Needling          mins self-pay  Traction          mins 62172      Timed Treatment:   46    mins   Total Treatment:     46   mins    Meagan Mendoza, PT  [place] License: [id number]  Electronically signed by Meagan Mendoza PT, 06/23/22, 12:41 PM EDT.

## 2022-06-24 ENCOUNTER — TREATMENT (OUTPATIENT)
Dept: PHYSICAL THERAPY | Facility: CLINIC | Age: 37
End: 2022-06-24

## 2022-06-24 DIAGNOSIS — R53.1 WEAKNESS: Primary | ICD-10-CM

## 2022-06-24 PROCEDURE — 97110 THERAPEUTIC EXERCISES: CPT | Performed by: PHYSICAL THERAPIST

## 2022-06-24 PROCEDURE — 97112 NEUROMUSCULAR REEDUCATION: CPT | Performed by: PHYSICAL THERAPIST

## 2022-06-24 PROCEDURE — 97530 THERAPEUTIC ACTIVITIES: CPT | Performed by: PHYSICAL THERAPIST

## 2022-06-24 NOTE — PROGRESS NOTES
Physical Therapy Daily Treatment Note      Patient: Steve Davidson   : 1985  Referring practitioner: Radha Moss DO  Date of Initial Visit: Type: THERAPY  Noted: 6/15/2022  Today's Date: 2022  Patient seen for 4 sessions       Visit Diagnoses:    ICD-10-CM ICD-9-CM   1. Weakness  R53.1 780.79       Subjective:  Patient arrives to therapy today w/ girlfriend.  Pt states he tolerated yesterday's session well w/ no complaints.      Objective   See Exercise, Manual, and Modality Logs for complete treatment.       Assessment/Plan:  Patient demonstrated good effort during today's session, and was provided w/ rest breaks as needed due to fatigue.  Treatment initiated w/ therex and therapeutic activities as listed for improved bilateral LE strength, improved functional mobility, f/b neuro re-ed for improved balance, and proprioception.  Exercise progressed w/ resistance of theraband increased from red to green with hip abduction, and knee flexion, as well as additional balance activities added including standing on foam surface while reaching for cones outside [place]. Pt provided w/ min assist to reduce fall risk, and to improve patient safety.  Pt reported soreness in low back, with side stepping, however with rest symptoms improved. Pt continues to benefit from therapy services, and will be progressed as tolerated to address goals, reduce fall risk, and improve strength.  Continue w/ PT's POC.       Timed:         Manual Therapy:         mins  75838;     Therapeutic Exercise:     32    mins  64818;     Neuromuscular Fatuma:   13     mins  25193;    Therapeutic Activity:     10     mins  38375;     Gait Training:           mins  77534;     Ultrasound:          mins  03371;    Ionto                                   mins   09685  Self Care                            mins   48579  Canalith Repos         mins 77562      Un-Timed:  Electrical Stimulation:         mins  41946 ( );  Dry Needling          mins  self-pay  Traction          mins 64142      Timed Treatment:  55    mins   Total Treatment:    55    mins    Yanci Garcia. Mayte, ANDREA  [place] License: [id number]

## 2022-06-27 ENCOUNTER — TELEPHONE (OUTPATIENT)
Dept: PHYSICAL THERAPY | Facility: CLINIC | Age: 37
End: 2022-06-27

## 2022-06-30 ENCOUNTER — TREATMENT (OUTPATIENT)
Dept: PHYSICAL THERAPY | Facility: CLINIC | Age: 37
End: 2022-06-30

## 2022-06-30 DIAGNOSIS — R53.1 WEAKNESS: Primary | ICD-10-CM

## 2022-06-30 PROCEDURE — 97110 THERAPEUTIC EXERCISES: CPT | Performed by: PHYSICAL THERAPIST

## 2022-06-30 PROCEDURE — 97112 NEUROMUSCULAR REEDUCATION: CPT | Performed by: PHYSICAL THERAPIST

## 2022-06-30 PROCEDURE — 97530 THERAPEUTIC ACTIVITIES: CPT | Performed by: PHYSICAL THERAPIST

## 2022-06-30 NOTE — PROGRESS NOTES
Physical Therapy Daily Treatment Note      Patient: Steve Davidson   : 1985  Referring practitioner: Radha Moss DO  Date of Initial Visit: Type: THERAPY  Noted: 6/15/2022  Today's Date: 2022  Patient seen for 5 sessions       Visit Diagnoses:    ICD-10-CM ICD-9-CM   1. Weakness  R53.1 780.79       Subjective Evaluation    History of Present Illness    Subjective comment: Patient reports that he had to get on the ground yesterday and had difficulty getting up, with patient requiring assistance.Pain  No pain reported           Objective   See Exercise, Manual, and Modality Logs for complete treatment.       Assessment & Plan     Assessment    Assessment details: Patient tolerated today's session well, with rest breaks provided as necessary.  There ex progressed to include the addition of 1# weights with seated march and LAQ.  Patient challenged with balance tasks, including standing on uphill and downhill slope while performing a beanbag toss.  Patient education performed on floor<>standing transfer.  During floor<>standing transfer, patient required CGA for safety.  Patient reported fatigue at conclusion of session.  He will continue to be progressed per his tolerance and POC.          Timed:         Manual Therapy:         mins  92669;     Therapeutic Exercise:    28    mins  59219;     Neuromuscular Fatuma:    15    mins  61057;    Therapeutic Activity:     12     mins  44260;     Gait Training:           mins  14848;     Ultrasound:          mins  85668;    Ionto                                   mins   30656  Self Care                            mins   35037  Canalith Repos         mins 10885      Un-Timed:  Electrical Stimulation:         mins  99182 ( );  Dry Needling          mins self-pay  Traction          mins 15130      Timed Treatment:   55   mins   Total Treatment:     55   mins    Meagan Mendoza PT  [place] License: [id number]  Electronically signed by Meagan Mendoza PT,  06/30/22, 4:26 PM EDT.

## 2022-07-06 ENCOUNTER — TREATMENT (OUTPATIENT)
Dept: PHYSICAL THERAPY | Facility: CLINIC | Age: 37
End: 2022-07-06

## 2022-07-06 DIAGNOSIS — R53.1 WEAKNESS: Primary | ICD-10-CM

## 2022-07-06 PROCEDURE — 97112 NEUROMUSCULAR REEDUCATION: CPT | Performed by: PHYSICAL THERAPIST

## 2022-07-06 PROCEDURE — 97110 THERAPEUTIC EXERCISES: CPT | Performed by: PHYSICAL THERAPIST

## 2022-07-06 NOTE — PROGRESS NOTES
Physical Therapy Daily Treatment Note      Patient: Steve Davidson   : 1985  Referring practitioner: Radha Moss DO  Date of Initial Visit: Type: THERAPY  Noted: 6/15/2022  Today's Date: 2022  Patient seen for 6 sessions       Visit Diagnoses:    ICD-10-CM ICD-9-CM   1. Weakness  R53.1 780.79       Subjective:  Patient arrives to therapy today w/ family.  Pt reports he has been trying to walk some without the walker at home over the past two days.  Pt states he fatigues easily,  however notes of no falls or near falls.  Pt reports he is scheduled to f/u with the neurologist on 2022, and have a nerve conduction study.  Pt states the bottom of his right foot continues to go numb at times, and feels cold.     Objective   See Exercise, Manual, and Modality Logs for complete treatment.       Assessment/Plan:  Patient completed today's session w/ frequent rest breaks required to due to fatigue.  Treatment initiated w/ seated and standing LE strengthening exercises f/b neuro re-ed for improved balance and to reduce fall risk.  Exercise progressed w/ LE ankle weights increased from one to two pounds.  Pt observed w/ some fatigue, however noted tolerable.  Pt had intermittent episodes of his knees buckling due to fatigue, however pt was able to self correct w/ UE assist.  Pt educated to utilize walker for improved patient safety due to continued weakness.  Pt verbalized understanding.  Pt assisted to vehicle utilizing RW, and assistance of Meagan Mendoza, PT, DPT for improved patient safety.  No adverse reactions observed during, and/ or following tx.  Continue w/ PT's POC.       Timed:         Manual Therapy:         mins  16245;     Therapeutic Exercise:   26      mins  77086;     Neuromuscular Fatuma:   20     mins  47283;    Therapeutic Activity:          mins  76956;     Gait Training:           mins  77061;     Ultrasound:          mins  62922;    Ionto                                   mins    30265  Self Care                            mins   93255  Canalith Repos         mins 60777      Un-Timed:  Electrical Stimulation:         mins  96516 ( );  Dry Needling          mins self-pay  Traction          mins 78900      Timed Treatment:  46    mins   Total Treatment:    46    mins    Yanci Garcia. ANDREA Hu  [place] License: [id number]

## 2022-07-07 ENCOUNTER — TREATMENT (OUTPATIENT)
Dept: PHYSICAL THERAPY | Facility: CLINIC | Age: 37
End: 2022-07-07

## 2022-07-07 DIAGNOSIS — R53.1 WEAKNESS: Primary | ICD-10-CM

## 2022-07-07 DIAGNOSIS — R29.898 UPPER EXTREMITY WEAKNESS: Primary | ICD-10-CM

## 2022-07-07 DIAGNOSIS — R27.9 LACK OF COORDINATION: ICD-10-CM

## 2022-07-07 DIAGNOSIS — M25.641 JOINT STIFFNESS OF HAND, RIGHT: ICD-10-CM

## 2022-07-07 PROCEDURE — 97110 THERAPEUTIC EXERCISES: CPT | Performed by: OCCUPATIONAL THERAPIST

## 2022-07-07 PROCEDURE — 97110 THERAPEUTIC EXERCISES: CPT | Performed by: PHYSICAL THERAPIST

## 2022-07-07 PROCEDURE — 97112 NEUROMUSCULAR REEDUCATION: CPT | Performed by: PHYSICAL THERAPIST

## 2022-07-07 PROCEDURE — 97112 NEUROMUSCULAR REEDUCATION: CPT | Performed by: OCCUPATIONAL THERAPIST

## 2022-07-07 PROCEDURE — 97530 THERAPEUTIC ACTIVITIES: CPT | Performed by: OCCUPATIONAL THERAPIST

## 2022-07-07 NOTE — PROGRESS NOTES
OccupationalTherapy Daily Progress Note        Patient: Steve Davidson   : 1985  Diagnosis/ICD-10 Code:  Upper extremity weakness [R29.898]  Referring practitioner: Radha Moss DO  Date of Initial Visit: Type: THERAPY  Noted: 2022  Today's Date: 2022  Patient seen for 3 sessions    Visit Diagnoses:    ICD-10-CM ICD-9-CM   1. Upper extremity weakness  R29.898 729.89   2. Joint stiffness of hand, right  M25.641 719.54   3. Lack of coordination  R27.9 781.3          Steve Davidson reports: tingling right lateral dorsal hand (digits 3,4,5) and forearm; an episode of significant increase in weakness yesterday but feeling better now.         Subjective Evaluation    History of Present Illness    Subjective comment: Pt reports no new concerns. Pt reports good HEP adherence. Pain  No pain reported    Patient Goals  Patient goals for therapy: increased motion, increased strength and independence with ADLs/IADLs           Objective   See Exercise, Manual, and Modality Logs for complete treatment.       Therapy Education 22 1000       Access Code: [email or web address]  URL: [email or web address]  Date: 2022  Prepared by: Kenny Maynes      Exercises  Standing Radial Nerve Glide - 1 x daily - 7 x weekly - 3 sets - 10 reps  Radial Nerve Mobilization - 1 x daily - 7 x weekly - 3 sets - 10 reps     Given HEP   Program New   How Provided Verbal;Demonstration;Written   Provided to Patient   Level of Understanding Teach back education performed;Verbalized;Demonstrated       Assessment & Plan     Assessment  Impairments: abnormal coordination, abnormal or restricted ROM, activity intolerance, impaired physical strength, lacks appropriate home exercise program and safety issue    Assessment details: Pt engaged in reciprocal overhead pulleys with BUE requiring a no rest break out of 15 minutes. OT provided cues for pacing and techniques to facilitate improved strength and tolerance. Pt completed graded clothes  pin pinch exercise x 3 sets with right UE to improve strength, tolerance, and coordination.  Pt completed right UE radial nerve glides in hope at improving right UE c/o sensation changes including tingling.   Prognosis: good  Prognosis details: Pt is making good progress with OT treatment and demonstrates good remaining OT potential.     Plan  Other planned modality interventions: electrical stimulation  Planned therapy interventions: manual therapy, motor coordination training, neuromuscular re-education, strengthening, therapeutic activities, joint mobilization, home exercise program, ADL retraining, fine motor coordination training, functional ROM exercises and soft tissue mobilization  Frequency: 2x week  Plan details: Continue current OT plan of care.         Progress per Plan of Care           Timed Codes        Manual Therapy:    0     mins  24691;    Therapeutic Exercise:    15     mins  14260;     Neuromuscular Fatuma:    15    mins  08273;    Therapeutic Activity:     15     mins  09636;      Ultrasound:     0     mins  83458;    Community/ work    0     mins  10159  Self Care/ Fidelina    0     mins  87029  Sensory Re-Int.             0     mins   91620  Cognitve Re-train    0     mins  19882     Un-timed Codes  Electrical Stimulation:    0     mins 9 42661 ( );      Timed Treatment:   45   mins   Total Treatment:     45   mins    Kenny D Maynes, OTR/L, CHT  Occupational Therapist, Certified Hand Therapist  [place] License #[id number]  NPI #[id number]  Electronically signed by: Kenny D. Maynes, OTR/L, CHT 7/7/2022

## 2022-07-07 NOTE — PROGRESS NOTES
Physical Therapy Daily Treatment Note      Patient: Steve Davidson   : 1985  Referring practitioner: Radha Moss DO  Date of Initial Visit: Type: THERAPY  Noted: 6/15/2022  Today's Date: 2022  Patient seen for 7 sessions       Visit Diagnoses:    ICD-10-CM ICD-9-CM   1. Weakness  R53.1 780.79       Subjective Evaluation    History of Present Illness    Subjective comment: Patient reports that his back is hurting today.  He notes that he is using his rollator today, since he was tired after yesterday's session.Pain  Current pain ratin  Location: Low back           Objective   See Exercise, Manual, and Modality Logs for complete treatment.       Assessment & Plan     Assessment    Assessment details: Therapy session consisted of there ex and neuromuscular re-education.  Rest breaks provided as necessary throughout today's session, due to fatigue.  Patient displayed difficulty with tandem stance and feet together eyes closed, with patient requiring CGA-min assist to help maintain balance; patient displayed loss of balance forward during balance tasks.  Patient will continue to be progressed per his tolerance and POC.          Timed:         Manual Therapy:         mins  89174;     Therapeutic Exercise:    24     mins  62358;     Neuromuscular Fatuma:    20    mins  13340;    Therapeutic Activity:          mins  02863;     Gait Training:           mins  67404;     Ultrasound:          mins  09159;    Ionto                                   mins   36034  Self Care                            mins   42223  Canalith Repos         mins 15046      Un-Timed:  Electrical Stimulation:         mins  98612 ( );  Dry Needling          mins self-pay  Traction          mins 84337      Timed Treatment:   44   mins   Total Treatment:     44   mins    Meagan Mendoza PT  [place] License: [id number]  Electronically signed by Meagan Mendoza PT, 22, 11:42 AM EDT.

## 2022-07-11 ENCOUNTER — TREATMENT (OUTPATIENT)
Dept: PHYSICAL THERAPY | Facility: CLINIC | Age: 37
End: 2022-07-11

## 2022-07-11 DIAGNOSIS — R53.1 WEAKNESS: Primary | ICD-10-CM

## 2022-07-11 PROCEDURE — 97110 THERAPEUTIC EXERCISES: CPT | Performed by: PHYSICAL THERAPIST

## 2022-07-11 PROCEDURE — 97112 NEUROMUSCULAR REEDUCATION: CPT | Performed by: PHYSICAL THERAPIST

## 2022-07-11 NOTE — PROGRESS NOTES
"  Physical Therapy Progress Note  Patient: Steve Davidson   : 1985  Diagnosis/ICD-10 Code:  Weakness [R53.1]  Referring practitioner: Radha Moss DO  Date of Initial Visit: Type: THERAPY  Noted: 6/15/2022  Today's Date: 2022  Patient seen for 8 sessions         Visit Diagnoses:    ICD-10-CM ICD-9-CM   1. Weakness  R53.1 780.79         Objective Measures: FOWLER  Clinical Progress: improved  Home Program Compliance: Yes      Subjective Evaluation    History of Present Illness    Subjective comment: Patient reports that he is improving with therapy.  He notes that his walking is doing better, but worsens with fatigue.  He also notes improved endurance and feels like he is \"more steady.\"         Objective          Neurological Testing     Sensation     Lumbar   Left   Intact: light touch    Right   Diminished: light touch    Strength/Myotome Testing     Left Hip   Planes of Motion   Flexion: 4+    Right Hip   Planes of Motion   Flexion: 4    Left Knee   Flexion: 4+  Extension: 4+    Right Knee   Flexion: 4  Extension: 4-    Left Ankle/Foot   Dorsiflexion: 4+  Plantar flexion: 4+    Right Ankle/Foot   Dorsiflexion: 3+  Plantar flexion: 4    Ambulation     Observational Gait   Decreased right stance time and right step length.     Quality of Movement During Gait     Additional Quality of Movement During Gait Details  Decreased foot clearance on right LE during ambulation.      Functional Assessment     Single Leg Stance - Eyes Open   Left  Trial 1: 10 seconds    Right  Trial 1: 2 seconds    Comments  Semi-tandem stance: 13 seconds            Assessment & Plan     Assessment  Impairments: abnormal gait, activity intolerance, impaired balance, impaired physical strength, lacks appropriate home exercise program, pain with function, safety issue and weight-bearing intolerance  Functional Limitations: lifting, walking, pulling, pushing, moving in bed, standing, stooping and unable to perform repetitive " tasks  Assessment details: Patient has been attending physical therapy for weakness; he has attended therapy for a total of 8 sessions.  Patient has shown improvements in LE strength and balance since start of care.  FOWLER has improved by 7 points, with score improving from 30/56 to 37/56.  Patient continues to have difficulty with SLS and achieving tandem stance position.  Patient displayed improved ease with sit<>stands, with patient requiring decreased UE assist.  Patient will continue to benefit from skilled PT so that he can achieve his maximum level of function and be at decreased risk for falls.  Prognosis: good    Goals  Plan Goals: Short Term Goals: 4 weeks  1. Pt will perform bilateral SLS for 5 seconds for improved balance.  Ongoing, progressing-L) 10 seconds, R) 2 seconds  2. Pt will score at least 36/56 on the Fowler Balance Scale for decreased risk of fall.  Met  3. Pt will perform sit<>stand transfers with minimal use of upper extremities to allow for improved independence.  Met    Long Term Goals: 12 weeks  1. Pt will perform bilateral SLS for 10 seconds for improved balance.  Ongoing, progressing  2. Pt will score at least 42/56 on the Fowler Balance Scale for decreased risk of fall.  Ongoing, progressing  3. Bilateral LE strength will improve to at least 4/5 for improved safety with ambulation on unlevel surfaces.  Ongoing, progressing  4. Patient will be able to achieve tandem stance and maintain balance for 30 seconds to show improved balance.  Ongoing, progressing-Able to achieve semi-tandem stance and maintain for 13 seconds  5. Patient will ambulate with least restrictive assistive device and improved foot clearance for improved safety with ambulation.  Ongoing, progressing        Plan  Therapy options: will be seen for skilled therapy services  Planned modality interventions: cryotherapy, thermotherapy (hydrocollator packs) and electrical stimulation/Russian stimulation  Planned therapy  interventions: manual therapy, balance/weight-bearing training, neuromuscular re-education, body mechanics training, postural training, soft tissue mobilization, flexibility, spinal/joint mobilization, functional ROM exercises, strengthening, gait training, stretching, home exercise program, therapeutic activities, IADL retraining, joint mobilization and transfer training  Frequency: 3x week  Duration in weeks: 8  Treatment plan discussed with: patient  Plan details: Re-evaluation   82226    Therapeutic exercise  06351  Therapeutic activity    90645  Neuromuscular re-education   18636  Manual therapy   68877  Gait training  31397    Attended e-stim  51562  Moist heat/cryotherapy 62369                Recommendations: Continue as planned  Timeframe: 2 months  Prognosis to achieve goals: good      Timed:         Manual Therapy:         mins  77054;     Therapeutic Exercise:    20     mins  16623;     Neuromuscular Fatuma:    25    mins  47014;    Therapeutic Activity:          mins  29401;     Gait Training:           mins  23849;     Ultrasound:          mins  08866;    Ionto                                   mins   06486  Self Care                            mins   07191  Canalith Repos         mins 01646      Un-Timed:  Electrical Stimulation:         mins  87354 (MC );  Dry Needling          mins self-pay  Traction          mins 62665  Re-Eval                               mins  65020      Timed Treatment:   45   mins   Total Treatment:    45    mins          PT: Meagan Mendoza PT     [place] License:  [id number]    Electronically signed by Meagan Mendoza PT, 07/11/22, 1:32 PM EDT    Certification Period: 7/11/2022 thru 10/8/2022  I certify that the therapy services are furnished while this patient is under my care.  The services outlined above are required by this patient, and will be reviewed every 90 days.         Physician Signature:__________________________________________________    PHYSICIAN: Isa  DO Radha  NPI: [id number]                                      DATE:  :     Please sign and return via fax to [email or web address] . Thank you, [facility]

## 2022-07-14 ENCOUNTER — TREATMENT (OUTPATIENT)
Dept: PHYSICAL THERAPY | Facility: CLINIC | Age: 37
End: 2022-07-14

## 2022-07-14 DIAGNOSIS — M25.641 JOINT STIFFNESS OF HAND, RIGHT: ICD-10-CM

## 2022-07-14 DIAGNOSIS — R53.1 WEAKNESS: Primary | ICD-10-CM

## 2022-07-14 DIAGNOSIS — R27.9 LACK OF COORDINATION: ICD-10-CM

## 2022-07-14 DIAGNOSIS — R29.898 UPPER EXTREMITY WEAKNESS: ICD-10-CM

## 2022-07-14 PROCEDURE — 97112 NEUROMUSCULAR REEDUCATION: CPT | Performed by: PHYSICAL THERAPIST

## 2022-07-14 PROCEDURE — 97530 THERAPEUTIC ACTIVITIES: CPT | Performed by: OCCUPATIONAL THERAPIST

## 2022-07-14 PROCEDURE — 97110 THERAPEUTIC EXERCISES: CPT | Performed by: PHYSICAL THERAPIST

## 2022-07-14 PROCEDURE — 97110 THERAPEUTIC EXERCISES: CPT | Performed by: OCCUPATIONAL THERAPIST

## 2022-07-14 NOTE — PROGRESS NOTES
Physical Therapy Daily Treatment Note      Patient: Steve Davidson   : 1985  Referring practitioner: Radha Moss DO  Date of Initial Visit: Type: THERAPY  Noted: 6/15/2022  Today's Date: 2022  Patient seen for 9 sessions       Visit Diagnoses:    ICD-10-CM ICD-9-CM   1. Weakness  R53.1 780.79       Subjective Evaluation    History of Present Illness    Subjective comment: Patient arrives to therapy with no new complaints.  He mentions that he went fishing yesterday and did not have too many problems with walking, but did not have to walk for very far.Pain  No pain reported           Objective   See Exercise, Manual, and Modality Logs for complete treatment.       Assessment & Plan     Assessment    Assessment details: Patient tolerated today's session well, with rest breaks provided as necessary.  Treatment consisted of there ex and neuromuscular re-education.  Patient progressed in there ex to include increased repetitions.  Patient showed improved form with stepping over objects, but did need occasional verbal cues to prevent circumduction.  CGA provided for safety with balance tasks.  He will continue to be progressed per his tolerance and POC.          Timed:         Manual Therapy:         mins  75955;     Therapeutic Exercise:    21     mins  89056;     Neuromuscular Fatuma:    23    mins  09801;    Therapeutic Activity:          mins  54255;     Gait Training:           mins  82377;     Ultrasound:          mins  35300;    Ionto                                   mins   51054  Self Care                            mins   85972  Canalith Repos         mins 66288      Un-Timed:  Electrical Stimulation:         mins  35175 ( );  Dry Needling          mins self-pay  Traction          mins 28843      Timed Treatment:   44   mins   Total Treatment:     44   mins    Meagan Mendoza PT  [place] License: [id number]  Electronically signed by Meagan Mendoza PT, 22, 11:19 AM  EDT.

## 2022-07-14 NOTE — PROGRESS NOTES
Occupational Therapy Progress and Treatment Note    Patient: Steve Davidson   : 1985  Diagnosis/ICD-10 Code:  Weakness [R53.1]  Referring practitioner: Radha Moss DO  Date of Initial Visit: 2022  Today's Date: 2022  Patient seen for 4 session         Visit Diagnoses:    ICD-10-CM ICD-9-CM   1. Weakness  R53.1 780.79   2. Upper extremity weakness  R29.898 729.89   3. Joint stiffness of hand, right  M25.641 719.54   4. Lack of coordination  R27.9 781.3         Subjective Questionnaire:   QuickDASH: 47.7 / 100 = 47.7 %  (22) >  61.4 / 100 = 61.4 % (22)      Subjective Evaluation    History of Present Illness  Date of onset: 2022  Mechanism of injury: Pt reports sudden loss of right UE strength resulting in an ER visit and a brief hospital stay from 22-22. Pt states that initally CVA was consider but then ruled out and hemiplegic migraines were considered.     Subjective comment: Pt states right hand is hurting due to attempting to spray paint. Pt reports good HEP (nerve glides) adherence.  Patient Occupation: [employer] indoor     Precautions and Work Restrictions: Currently off workPain  No pain reported    Social Support  Lives in: trailer  Lives with: significant other    Hand dominance: right    Treatments  Current treatment: physical therapy  Discharged from (in last 30 days): inpatient hospitalization  Patient Goals  Patient goals for therapy: improved balance, increased motion, increased strength and independence with ADLs/IADLs             Objective          Observations     Left Wrist/Hand   Positive for trophic changes.     Right Wrist/Hand   Positive for trophic changes.       Neurological Testing     Additional Neurological Details  Pt reports right whole hand numbness at times    Active Range of Motion   Left Shoulder   Normal active range of motion    Right Shoulder   Normal active range of motion    Left Elbow   Normal active range of  motion    Right Elbow   Normal active range of motion    Left Wrist   Normal active range of motion    Right Wrist   Normal active range of motion    Right Thumb   Opposition: Kapandji Opposition Scale: 9/10 (6/16/22) > 10/10 (7/14/22)      Additional Active Range of Motion Details      Right: WFL AROM range but slow stiff movement    Passive Range of Motion   Left Shoulder   Normal passive range of motion    Right Shoulder   Normal passive range of motion    Strength/Myotome Testing     Left Shoulder   Normal muscle strength    Right Shoulder     Planes of Motion   Flexion: 3   Extension: 3   Abduction: 3   Adduction: 3     Left Elbow   Normal strength    Right Elbow   Flexion: 3  Extension: 3    Left Wrist/Hand   Normal wrist strength     (2nd hand position)     Trial 1: 74 lbs    Trial 2: 64 lbs    Trial 3: 60 lbs    Average: 66 lbs    Right Wrist/Hand   Wrist extension: 3  Wrist flexion: 3     (2nd hand position)     Trial 1: 29 lbs    Trial 2: 26 lbs    Trial 3: 20 lbs    Average: 25 lbs    Additional Strength Details  Left  strength (2nd hand position) 74 lbs (6/16/22) > 66 pounds (7/14/22)  Right  strength (2nd hand position) 42 lbs (6/16/22) > 25 pounds (7/14/22)    Tests     Right Wrist/Hand   Negative extrinsic extensor tightness, extrinsic flexor tightness and intrinsic muscle tightness.     Ambulation   Weight-Bearing Status   Assistive device used: rollator walker with seat    Functional Assessment     Comments  UE Coordination Testing:    Box and Blocks:              Left=40 (6/16/22) > 40 (7/14/22)              Right=37 (6/16/22) > 30  (7/14/22)  9-Hole-Peg:              Left=26.38 (6/16/22) > 22.61  (7/14/22)              Right=30.69 (6/16/22) > 30.03    Kapandji Opposition Scale: 9/10 (6/16/22) >10/10 (7/14/22)          Assessment & Plan     Assessment  Impairments: abnormal coordination, abnormal muscle tone, abnormal or restricted ROM, activity intolerance, impaired balance,  impaired physical strength, lacks appropriate home exercise program and safety issue    Assessment details: Pt seen for OT treatment and progress note. Pt initially c/o increased right hand pain stating it was due to spray painting; later in session when asked again, patient reported no right hand pain and c/o tingling. OT provided pt with cues and instruction to complete overhead bue reciprocal pulleys to improve strength, tolerance, and joint stiffness. Pt completed without rest break required this session. Pt completed BUE strength, ROM, and coordination testing. Pt demonstrates improving fine motor coordination, generally unchanged gross motor skills, and some decreased in  strength. Plan to possibly attempt 5 position  test next session. Pt ambulating from lobby to therapy gym with a slightly limp and minimal unsteadiness. Pt will benefit from continued OT treatment to address deficits to improve safety and independence in ADL tasks.   Prognosis: good  Prognosis details: Pt is making good progress with OT treatment and demonstrates good remaining OT potential.    Goals  Plan Goals: OT Short Term Goals  STG Date to Achieve 08/11/22  STG 1 Pt will demonstrate good return on beginning HEP  STG 1 Progress Partially Met;Progressing  STG 2 Pt will improve right grasp strength by 20 pounds to improve ADL independence.  STG 2 Progress Ongoing  STG 3 Pt will improve right UE fine motor skills with 5 second improvement on 9-hole peg test to improve ADL independence  STG 3 Progress Progressing  STG 4 Pt will right UE strength to 4/5 MMT for increased ADL independence  STG 4 Progress Progressing  STG 5 Pt will improve QuickDASH score by 20 point to improve ADL independence  STG 5 Progress Ongoing    Long Term Goals  LTG Date to Achieve 09/08/22  LTG 1 Pt will demonstrate good return on beginning HEP  LTG 1 Progress Progressing  LTG 2 Pt will improve right grasp strength by 40 pouinds to improve ADL  independence.  LTG 2 Progress Ongoing  LTG 3 Pt will improve right UE fine motor skills with 10 second improvement on 9-hole peg test to improve ADL independence  LTG 3 Progress Progressing  LTG 4 Pt will right UE strength to 5/5 MMT for increased ADL independence  LTG 4 Progress Progressing  LTG 5 Pt will improve QuickDASH score by 50 points to improve ADL independence  LTG 5 Progress Ongoing      Plan  Planned modality interventions: electrical stimulation/Russian stimulation  Planned therapy interventions: ADL retraining, fine motor coordination training, flexibility, functional ROM exercises, home exercise program, IADL retraining, joint mobilization, manual therapy, motor coordination training, neuromuscular re-education, orthotic fitting/training, strengthening, stretching, therapeutic activities and transfer training  Frequency: 2x week  Duration in weeks: 12  Treatment plan discussed with: patient  Plan details: continue current OT plan of care.               CPT Code CPT Description Minutes/Units Provided   30180 OT Evaluation, Low     94203 OT Evaluation, Moderate     87808 OT Evaluation, High      OT Re-evaluation          57413 OT Therapeutic Exercise 24   99231 OT Therapeutic Activity 17   95586 OT Neuromuscular Re-education    43522 OT Manual Therapy    34944 OT Orthotic Fit and Train         57153 OT Attended e-stim      OT Unattended e-stim    47353  OT Moist heat/cryotherapy    45205 OT Ultrasound Minutes    70669 OT Iontophoresis    49135 OT Paraffin         Other: Other:       Total Timed Minutes: 41          OT: Kenny D Maynes, OTR/L, [place]     [place] License #[id number]  NPI #[id number]  Electronically signed by: Kenny D. Maynes, OTR/L, CHT 7/14/2022

## 2022-07-15 ENCOUNTER — TREATMENT (OUTPATIENT)
Dept: PHYSICAL THERAPY | Facility: CLINIC | Age: 37
End: 2022-07-15

## 2022-07-15 DIAGNOSIS — R53.1 WEAKNESS: Primary | ICD-10-CM

## 2022-07-15 PROCEDURE — 97112 NEUROMUSCULAR REEDUCATION: CPT | Performed by: PHYSICAL THERAPIST

## 2022-07-15 PROCEDURE — 97110 THERAPEUTIC EXERCISES: CPT | Performed by: PHYSICAL THERAPIST

## 2022-07-15 NOTE — PROGRESS NOTES
Physical Therapy Daily Treatment Note      Patient: Steve Davidson   : 1985  Referring practitioner: Radha Moss DO  Date of Initial Visit: Type: THERAPY  Noted: 6/15/2022  Today's Date: 7/15/2022  Patient seen for 10 sessions       Visit Diagnoses:    ICD-10-CM ICD-9-CM   1. Weakness  R53.1 780.79       Subjective:  Patient states he is a little tired this morning due to not resting much last night.  Pt reports he had his new patient appointment with the neurologist yesterday, and was advised to continue w/ therapy.  Pt states MD recommended additional testing, and he is scheduled to f/u again in October.  Pt notes he is on a call list if they get an earlier appointment.      Objective   See Exercise, Manual, and Modality Logs for complete treatment.       Assessment/Plan:  Patient responded well to today's session, w/ rest breaks provided as needed due to fatigue.  Treatment initiated w/ therex as listed for improved bilateral LE strength, and functional mobility f/b neuro re-ed to address balance deficits and reduce fall risk.  Exercise progressed w/ resistance of theraband with hip abduction and knee flexion increased from green to blue, w/ good tolerance observed.  Pt continues to require min assist during standing and balance activities for improved safety.  No signs of distress or adverse reactions observed during, and/ or following tx.  Pt continues to benefit from therapy services, and will be progressed as tolerated to address goals, improve strength, and reduce fall risk.  Continue w/ PT's POC.       Timed:         Manual Therapy:         mins  77373;     Therapeutic Exercise:    24     mins  31422;     Neuromuscular Fatuma:   21     mins  78571;    Therapeutic Activity:          mins  39579;     Gait Training:           mins  83872;     Ultrasound:          mins  24805;    Ionto                                   mins   34834  Self Care                            mins   07195  [facility]          mins 64023      Un-Timed:  Electrical Stimulation:         mins  55037 ( );  Dry Needling          mins self-pay  Traction          mins 04203      Timed Treatment:  45    mins   Total Treatment:    45    mins    Yanci Garcia. ANDREA Hu  [place] License: [id number]

## 2022-07-19 ENCOUNTER — TREATMENT (OUTPATIENT)
Dept: PHYSICAL THERAPY | Facility: CLINIC | Age: 37
End: 2022-07-19

## 2022-07-19 DIAGNOSIS — R53.1 WEAKNESS: Primary | ICD-10-CM

## 2022-07-19 PROCEDURE — 97530 THERAPEUTIC ACTIVITIES: CPT | Performed by: PHYSICAL THERAPIST

## 2022-07-19 PROCEDURE — 97110 THERAPEUTIC EXERCISES: CPT | Performed by: PHYSICAL THERAPIST

## 2022-07-19 PROCEDURE — 97112 NEUROMUSCULAR REEDUCATION: CPT | Performed by: PHYSICAL THERAPIST

## 2022-07-19 NOTE — PROGRESS NOTES
Physical Therapy Daily Treatment Note      Patient: Steve Davidson   : 1985  Referring practitioner: Radha Moss DO  Date of Initial Visit: Type: THERAPY  Noted: 6/15/2022  Today's Date: 2022  Patient seen for 11 sessions       Visit Diagnoses:    ICD-10-CM ICD-9-CM   1. Weakness  R53.1 780.79       Subjective:  Patient arrives to therapy today w/ family.  Pt states of no falls or no near falls recently at home.  Pt reports he tolerated his last PT session well w/ good tolerance.     Objective   See Exercise, Manual, and Modality Logs for complete treatment.       Assessment/Plan: Patient demonstrated good effort during today's session, and was provided w/ rest breaks as needed due to fatigue.  Treatment consisted of therex and therapeutic activities as listed w/ continued focus on improved bilateral LE strength and improved functional mobility f/b neuro re-ed to address balance deficits, and reduce fall risk. Exercise progressed w/ resistance of ankle weights increased from 2 to 2.5 pounds with seated marches and LAQ's, and additional balance activities added to simulate home environment.  Pt continues to require contact guard to min assist for improved patient safety and to reduce fall risk during standing activities.  No signs of distress or adverse reactions observed during, and/ or following tx.  Pt continues to benefit from therapy services, and will be progressed as tolerated.  Continue w/ PT's POC.      Timed:         Manual Therapy:         mins  12985;     Therapeutic Exercise:     35    mins  77799;     Neuromuscular Fatuma:    11    mins  77630;    Therapeutic Activity:     8     mins  55704;     Gait Training:           mins  05675;     Ultrasound:          mins  63610;    Ionto                                   mins   25559  Self Care                            mins   29219  Canalith Repos         mins 65788      Un-Timed:  Electrical Stimulation:         mins  94430 ( );  Dry  Needling          mins self-pay  Traction          mins 67311      Timed Treatment:  54    mins   Total Treatment:    54    mins    Yanci Garcia. ANDREA Hu  [place] License: [id number]

## 2022-07-21 ENCOUNTER — TREATMENT (OUTPATIENT)
Dept: PHYSICAL THERAPY | Facility: CLINIC | Age: 37
End: 2022-07-21

## 2022-07-21 DIAGNOSIS — R29.898 UPPER EXTREMITY WEAKNESS: ICD-10-CM

## 2022-07-21 DIAGNOSIS — M25.641 JOINT STIFFNESS OF HAND, RIGHT: ICD-10-CM

## 2022-07-21 DIAGNOSIS — R53.1 WEAKNESS: Primary | ICD-10-CM

## 2022-07-21 DIAGNOSIS — R27.9 LACK OF COORDINATION: ICD-10-CM

## 2022-07-21 PROCEDURE — 97112 NEUROMUSCULAR REEDUCATION: CPT | Performed by: PHYSICAL THERAPIST

## 2022-07-21 PROCEDURE — 97530 THERAPEUTIC ACTIVITIES: CPT | Performed by: OCCUPATIONAL THERAPIST

## 2022-07-21 PROCEDURE — 97110 THERAPEUTIC EXERCISES: CPT | Performed by: OCCUPATIONAL THERAPIST

## 2022-07-21 PROCEDURE — 97110 THERAPEUTIC EXERCISES: CPT | Performed by: PHYSICAL THERAPIST

## 2022-07-21 PROCEDURE — 97112 NEUROMUSCULAR REEDUCATION: CPT | Performed by: OCCUPATIONAL THERAPIST

## 2022-07-21 NOTE — PROGRESS NOTES
Physical Therapy Daily Treatment Note      Patient: Steve Davidson   : 1985  Referring practitioner: Radha Moss DO  Date of Initial Visit: Type: THERAPY  Noted: 6/15/2022  Today's Date: 2022  Patient seen for 12 sessions       Visit Diagnoses:    ICD-10-CM ICD-9-CM   1. Weakness  R53.1 780.79   2. Lack of coordination  R27.9 781.3       Subjective Evaluation    History of Present Illness    Subjective comment: The patient reported 0/10 pain prior to today's session. He reported no falls, questions, or concerns.Pain  Current pain ratin           Objective   See Exercise, Manual, and Modality Logs for complete treatment.       Assessment & Plan     Assessment    Assessment details: Today's treatment session included therapeutic exercises for improved BLE strength as well as neuromuscular re-education for improved balance. CGA/min assist was provided during neuromuscular re-education secondary to impaired balance. Rest breaks were provided as needed secondary to fatigue. The patient demonstrated decreased right stance phase with knee buckling during gait.    Plan  Plan details: Progress neuromuscular re-education as able for improved balance and stability.          Timed:         Manual Therapy:         mins  98637;     Therapeutic Exercise:    26     mins  86109;     Neuromuscular Fatuma:    17    mins  79408;    Therapeutic Activity:          mins  60707;     Gait Training:           mins  44891;     Ultrasound:          mins  29287;    Ionto                                   mins   51203  Self Care                            mins   25123  Canalith Repos         mins 80470      Un-Timed:  Electrical Stimulation:         mins  38772 ( );  Dry Needling          mins self-pay  Traction          mins 36450      Timed Treatment:   43   mins   Total Treatment:     43   mins    Ashley Claudene Dalton, PT  [place] License: [id number]

## 2022-07-21 NOTE — PROGRESS NOTES
Occupational Therapy Daily Treatment Note    Patient: Steve Davidson   : 1985  Diagnosis/ICD-10 Code:  Weakness [R53.1]  Referring practitioner: Radha Moss DO  Date of Initial Visit: 2022  Today's Date: 2022  Patient seen for 5 session         Visit Diagnoses:    ICD-10-CM ICD-9-CM   1. Weakness  R53.1 780.79   2. Upper extremity weakness  R29.898 729.89   3. Joint stiffness of hand, right  M25.641 719.54   4. Lack of coordination  R27.9 781.3               Subjective Evaluation    History of Present Illness  Date of onset: 2022  Mechanism of injury: Pt reports sudden loss of right UE strength resulting in an ER visit and a brief hospital stay from 22-22. Pt states that initally CVA was consider but then ruled out and hemiplegic migraines were considered.     Subjective comment: Pt reports no new concerns.  Pt reports good HEP (nerve glides) adherence.  Patient Occupation: [employer]     Precautions and Work Restrictions: Currently off workPain  No pain reported    Social Support  Lives in: trailer  Lives with: significant other    Hand dominance: right    Treatments  Current treatment: physical therapy  Discharged from (in last 30 days): inpatient hospitalization  Patient Goals  Patient goals for therapy: improved balance, increased motion, increased strength and independence with ADLs/IADLs             Objective          Observations     Left Wrist/Hand   Positive for trophic changes.     Right Wrist/Hand   Positive for trophic changes.       Neurological Testing     Additional Neurological Details  Pt reports right whole hand numbness at times    Active Range of Motion   Left Shoulder   Normal active range of motion    Right Shoulder   Normal active range of motion    Left Elbow   Normal active range of motion    Right Elbow   Normal active range of motion    Left Wrist   Normal active range of motion    Right Wrist   Normal active range of motion    Right  Thumb   Opposition: Kapandji Opposition Scale: 9/10 (6/16/22) > 10/10 (7/14/22)      Additional Active Range of Motion Details      Right: WFL AROM range but slow stiff movement    Passive Range of Motion   Left Shoulder   Normal passive range of motion    Right Shoulder   Normal passive range of motion    Strength/Myotome Testing     Left Shoulder   Normal muscle strength    Right Shoulder     Planes of Motion   Flexion: 3   Extension: 3   Abduction: 3   Adduction: 3     Left Elbow   Normal strength    Right Elbow   Flexion: 3  Extension: 3    Additional Strength Details  Left  strength (2nd hand position) 74 lbs (6/16/22) > 66 pounds (7/14/22)  Right  strength (2nd hand position) 42 lbs (6/16/22) > 25 pounds (7/14/22)    5 handle  test (07/21/22):    Right= (1) 24    ( 2) 41     (3) 43     (4) 39    (5) 35  Left= (1) 43    (2) 59     (3) 72     (4) 58     (5) 44                 Tests     Right Wrist/Hand   Negative extrinsic extensor tightness, extrinsic flexor tightness and intrinsic muscle tightness.     Ambulation   Weight-Bearing Status   Assistive device used: rollator walker with seat    Functional Assessment     Comments  UE Coordination Testing:    Box and Blocks:              Left=40 (6/16/22) > 40 (7/14/22)              Right=37 (6/16/22) > 30  (7/14/22)  9-Hole-Peg:              Left=26.38 (6/16/22) > 22.61  (7/14/22)              Right=30.69 (6/16/22) > 30.03    Kapandji Opposition Scale: 9/10 (6/16/22) >10/10 (7/14/22)                  Assessment & Plan     Assessment  Impairments: abnormal coordination, abnormal muscle tone, abnormal or restricted ROM, activity intolerance, impaired balance, impaired physical strength, lacks appropriate home exercise program and safety issue    Assessment details:  OT provided pt with cues and instruction to complete overhead bue reciprocal pulleys to improve strength, tolerance, and joint stiffness. Pt completed with brief rest break required this  session. Pt completed 5  test with clear bell curve on left UE but left UE somewhat flat but highest result on position 2 and 3 as typically expected. Pt completed clothespins pinch activity with right UE modified by to improve strength and coordination. Pt completed right UE nerve glides and continues to have c/o right UE sensory impairments.   Prognosis: good  Prognosis details: Pt is making good progress with OT treatment and demonstrates good remaining OT potential.    Plan  Planned modality interventions: electrical stimulation/Russian stimulation  Planned therapy interventions: ADL retraining, fine motor coordination training, flexibility, functional ROM exercises, home exercise program, IADL retraining, joint mobilization, manual therapy, motor coordination training, neuromuscular re-education, orthotic fitting/training, strengthening, stretching, therapeutic activities and transfer training  Frequency: 2x week  Duration in weeks: 12      Treatment plan discussed with: patient  Plan details: continue current OT plan of care.               CPT Code CPT Description Minutes/Units Provided   93244 OT Evaluation, Low     84050 OT Evaluation, Moderate     83294 OT Evaluation, High      OT Re-evaluation          81898 OT Therapeutic Exercise 15   24461 OT Therapeutic Activity 16   18713 OT Neuromuscular Re-education 13   04266 OT Manual Therapy    79875 OT Orthotic Fit and Train         52248 OT Attended e-stim      OT Unattended e-stim    85257  OT Moist heat/cryotherapy    80617 OT Ultrasound Minutes    59391 OT Iontophoresis    07239 OT Paraffin         Other: Other:       Total Timed Minutes: 44          OT: Kenny D Maynes, OTR/L, CHT     [place] License #[id number]  NPI #[id number]  Electronically signed by: Kenny D. Maynes, OTR/L, CHT 7/21/2022

## 2022-07-26 ENCOUNTER — TREATMENT (OUTPATIENT)
Dept: PHYSICAL THERAPY | Facility: CLINIC | Age: 37
End: 2022-07-26

## 2022-07-26 DIAGNOSIS — R53.1 WEAKNESS: Primary | ICD-10-CM

## 2022-07-26 PROCEDURE — 97112 NEUROMUSCULAR REEDUCATION: CPT | Performed by: PHYSICAL THERAPIST

## 2022-07-26 PROCEDURE — 97110 THERAPEUTIC EXERCISES: CPT | Performed by: PHYSICAL THERAPIST

## 2022-07-26 NOTE — PROGRESS NOTES
Physical Therapy Daily Treatment Note      Patient: Steve Davidson   : 1985  Referring practitioner: Radha Moss DO  Date of Initial Visit: Type: THERAPY  Noted: 6/15/2022  Today's Date: 2022  Patient seen for 13 sessions       Visit Diagnoses:    ICD-10-CM ICD-9-CM   1. Weakness  R53.1 780.79       Subjective:  Patient arrives to therapy today w/ family.  Pt reports he continues to have a lot of pain in his feet, along with numbness and tingling.  Pt is scheduled to have a nerve conduction study on 2022.    Objective   See Exercise, Manual, and Modality Logs for complete treatment.       Assessment/Plan:  Patient demonstrated good effort during today's, and was provided w/ rest breaks as needed due to fatigue.  Treatment consist of therex as listed f/b therapeutic activities and neuro re-ed to reduce fall risk, and assist w/ balance deficits.  Exercise progressed w/ strengthening reps increased from 20 to 30 with seated marches, LAQ's, and standing marches.  Pt observed w/ some fatigue, however noted tolerable.  Pt provided w/ contact guard assist during standing balance activities for improved patient safety.  No signs of distress or adverse reactions obesrved during and, or following tx.  Pt continues to benefit from therapy services, and will be progressed as tolerated to address goals, improve strength, and reduce fall risk.  Continue w/ PT's POC.       Timed:         Manual Therapy:         mins  97326;     Therapeutic Exercise:   20      mins  69243;     Neuromuscular Fatuma:  26      mins  95433;    Therapeutic Activity:          mins  42017;     Gait Training:           mins  09847;     Ultrasound:          mins  88500;    Ionto                                   mins   91528  Self Care                            mins   55713  Canalith Repos         mins 06929      Un-Timed:  Electrical Stimulation:         mins  53755 ( );  Dry Needling          mins self-pay  Traction           mins 93728      Timed Treatment:   46   mins   Total Treatment:    46    mins    Yanci Garcia. ANDREA Hu  [place] License: [id number]

## 2022-07-28 ENCOUNTER — TREATMENT (OUTPATIENT)
Dept: PHYSICAL THERAPY | Facility: CLINIC | Age: 37
End: 2022-07-28

## 2022-07-28 DIAGNOSIS — M25.641 JOINT STIFFNESS OF HAND, RIGHT: ICD-10-CM

## 2022-07-28 DIAGNOSIS — R53.1 WEAKNESS: Primary | ICD-10-CM

## 2022-07-28 DIAGNOSIS — R27.9 LACK OF COORDINATION: ICD-10-CM

## 2022-07-28 DIAGNOSIS — R29.898 UPPER EXTREMITY WEAKNESS: ICD-10-CM

## 2022-07-28 PROCEDURE — 97112 NEUROMUSCULAR REEDUCATION: CPT | Performed by: PHYSICAL THERAPIST

## 2022-07-28 PROCEDURE — 97112 NEUROMUSCULAR REEDUCATION: CPT | Performed by: OCCUPATIONAL THERAPIST

## 2022-07-28 PROCEDURE — 97110 THERAPEUTIC EXERCISES: CPT | Performed by: OCCUPATIONAL THERAPIST

## 2022-07-28 PROCEDURE — 97110 THERAPEUTIC EXERCISES: CPT | Performed by: PHYSICAL THERAPIST

## 2022-07-28 PROCEDURE — 97530 THERAPEUTIC ACTIVITIES: CPT | Performed by: OCCUPATIONAL THERAPIST

## 2022-07-28 NOTE — PROGRESS NOTES
Physical Therapy Daily Treatment Note      Patient: Steve Davidson   : 1985  Referring practitioner: Radha Moss DO  Date of Initial Visit: Type: THERAPY  Noted: 6/15/2022  Today's Date: 2022  Patient seen for 14 sessions       Visit Diagnoses:    ICD-10-CM ICD-9-CM   1. Weakness  R53.1 780.79       Subjective Evaluation    History of Present Illness    Subjective comment: Patient reports that he is having a bad day with his legs bothering him, noting that his legs are burning.       Objective   See Exercise, Manual, and Modality Logs for complete treatment.       Assessment & Plan     Assessment    Assessment details: Therapy session consisted of neuromuscular re-education and there ex, per flow sheet.  During step taps, patient displayed one episode of loss of balance which required mod assist from PT and UE assist from patient at the staircase to prevent fall.  Patient fatigued quickly with standing activities and required frequent rest breaks.  He will continue to be progressed per his tolerance and POC.          Timed:         Manual Therapy:         mins  57404;     Therapeutic Exercise:    25     mins  38094;     Neuromuscular Fatuma:  18      mins  84095;    Therapeutic Activity:          mins  31696;     Gait Training:           mins  81473;     Ultrasound:          mins  33385;    Ionto                                   mins   10247  Self Care                            mins   28227  Canalith Repos         mins 48700      Un-Timed:  Electrical Stimulation:         mins  77997 ( );  Dry Needling          mins self-pay  Traction          mins 06895      Timed Treatment:   43   mins   Total Treatment:     43   mins    Meagan Mendoza PT  [place] License: [id number]  Electronically signed by Meagan Mendoza PT, 22, 11:12 AM EDT.

## 2022-07-28 NOTE — PROGRESS NOTES
Occupational Therapy Daily Treatment Note    Patient: Steve Davidson   : 1985  Diagnosis/ICD-10 Code:  Weakness [R53.1]  Referring practitioner: Radha Moss DO  Date of Initial Visit: 2022  Today's Date: 2022  Patient seen for 6 session         Visit Diagnoses:    ICD-10-CM ICD-9-CM   1. Weakness  R53.1 780.79   2. Upper extremity weakness  R29.898 729.89   3. Joint stiffness of hand, right  M25.641 719.54   4. Lack of coordination  R27.9 781.3               Subjective Evaluation    History of Present Illness  Date of onset: 2022  Mechanism of injury: Sudden loss of right UE strength resulting in an ER visit and a brief hospital stay from 22-22. Iinitally CVA was considered but then ruled out and hemiplegic migraines were considered.     Subjective comment: Pt reports no new concerns.    Patient Occupation: [employer] indoor     Precautions and Work Restrictions: Currently off workPain  No pain reported  Current pain ratin  At best pain rating: 3  At worst pain ratin  Location: right UE  Aggravating factors: repetitive movement and overhead activity    Social Support  Lives in: trailer  Lives with: significant other    Hand dominance: right    Treatments  Current treatment: physical therapy  Discharged from (in last 30 days): inpatient hospitalization  Patient Goals  Patient goals for therapy: improved balance, increased motion, increased strength and independence with ADLs/IADLs             Objective          Observations     Left Wrist/Hand   Positive for trophic changes.     Right Wrist/Hand   Positive for trophic changes.       Neurological Testing     Additional Neurological Details  Pt reports right whole hand numbness at times    Active Range of Motion   Left Shoulder   Normal active range of motion    Right Shoulder   Normal active range of motion    Left Elbow   Normal active range of motion    Right Elbow   Normal active range of motion    Left Wrist    Normal active range of motion    Right Wrist   Normal active range of motion    Right Thumb   Opposition: Kapandji Opposition Scale: 9/10 (6/16/22) > 10/10 (7/14/22)      Additional Active Range of Motion Details      Right: WFL AROM range but slow stiff movement    Passive Range of Motion   Left Shoulder   Normal passive range of motion    Right Shoulder   Normal passive range of motion    Strength/Myotome Testing     Left Shoulder   Normal muscle strength    Right Shoulder     Planes of Motion   Flexion: 3   Extension: 3   Abduction: 3   Adduction: 3     Left Elbow   Normal strength    Right Elbow   Flexion: 3  Extension: 3    Additional Strength Details  Left  strength (2nd hand position) 74 lbs (6/16/22) > 66 pounds (7/14/22)  Right  strength (2nd hand position) 42 lbs (6/16/22) > 25 pounds (7/14/22)    5 handle  test:    Right (1-5) = 24; 41; 43; 39; 35 (07/21/22)  > 20,35,33,29,27 (07/28/22)  Left (1-5)    = 43; 59; 72; 58; 44 (07/21/22) > 40,67,76,68,54 (07/28/22)                 Tests     Right Wrist/Hand   Negative extrinsic extensor tightness, extrinsic flexor tightness and intrinsic muscle tightness.     Ambulation   Weight-Bearing Status   Assistive device used: rollator walker with seat    Functional Assessment     Comments  UE Coordination Testing:    Box and Blocks:              Left=40 (6/16/22) > 40 (7/14/22)              Right=37 (6/16/22) > 30  (7/14/22)  9-Hole-Peg:              Left=26.38 (6/16/22) > 22.61  (7/14/22)              Right=30.69 (6/16/22) > 30.03    Kapandji Opposition Scale: 9/10 (6/16/22) >10/10 (7/14/22)                    Assessment & Plan     Assessment  Impairments: abnormal coordination, abnormal muscle tone, abnormal or restricted ROM, activity intolerance, impaired balance, impaired physical strength, lacks appropriate home exercise program and safety issue    Assessment details:  OT provided pt with cues and instruction to complete overhead bue reciprocal  pulleys to improve strength, tolerance, and joint stiffness. Pt with no brief rest breaks required this session. Pt completed 5  test with somewhat of a bell curve . Pt completed clothespins pinch activity with right UE modified by to improve strength and coordination. Pt completed right UE nerve glides including progression of HEP with continued  c/o right UE sensory impairments.   Prognosis: good  Prognosis details: Pt is making good progress with OT treatment and demonstrates good remaining OT potential.    Plan  Planned modality interventions: electrical stimulation/Russian stimulation  Planned therapy interventions: ADL retraining, fine motor coordination training, flexibility, functional ROM exercises, home exercise program, IADL retraining, joint mobilization, manual therapy, motor coordination training, neuromuscular re-education, orthotic fitting/training, strengthening, stretching, therapeutic activities and transfer training  Frequency: 2x week  Duration in weeks: 12  Treatment plan discussed with: patient  Plan details: continue current OT plan of care.               CPT Code CPT Description Minutes/Units Provided   94959 OT Evaluation, Low     12396 OT Evaluation, Moderate     15943 OT Evaluation, High      OT Re-evaluation          11706 OT Therapeutic Exercise 15   14095 OT Therapeutic Activity 18   97461 OT Neuromuscular Re-education 12   28823 OT Manual Therapy    91335 OT Orthotic Fit and Train         41976 OT Attended e-stim      OT Unattended e-stim    76047  OT Moist heat/cryotherapy    25260 OT Ultrasound Minutes    99554 OT Iontophoresis    58188 OT Paraffin         Other: Other:       Total Timed Minutes: 45          OT: Kenny D Maynes, OTR/L, CHT     [place] License #[id number]  NPI #[id number]  Electronically signed by: Kenny D. Maynes, OTR/L, CHT 7/28/2022

## 2022-07-29 ENCOUNTER — TREATMENT (OUTPATIENT)
Dept: PHYSICAL THERAPY | Facility: CLINIC | Age: 37
End: 2022-07-29

## 2022-07-29 DIAGNOSIS — R53.1 WEAKNESS: Primary | ICD-10-CM

## 2022-07-29 PROCEDURE — 97112 NEUROMUSCULAR REEDUCATION: CPT | Performed by: PHYSICAL THERAPIST

## 2022-07-29 PROCEDURE — 97110 THERAPEUTIC EXERCISES: CPT | Performed by: PHYSICAL THERAPIST

## 2022-07-29 NOTE — PROGRESS NOTES
Physical Therapy Daily Treatment Note      Patient: Steve Davidson   : 1985  Referring practitioner: Radha Moss DO  Date of Initial Visit: Type: THERAPY  Noted: 6/15/2022  Today's Date: 2022  Patient seen for 15 sessions       Visit Diagnoses:    ICD-10-CM ICD-9-CM   1. Weakness  R53.1 780.79       Subjective:  Patient states he has had difficulty sleeping the past two nights due to foot pain.  Pt reports his MD has him on Neurontin three times daily, but he feels the pain has worsened over the past two weeks.  Pt states he has been tolerating the therapy sessions well w/ no complaints.     Objective   See Exercise, Manual, and Modality Logs for complete treatment.       Assessment/Plan:  Discussed pt's subjective reports with supervising therapist, Meagan Mendoza, PT, DPT.  PT and PTA educated patient to continue to monitor symptoms, and contact PCP and notify them of changes.  Pt verbalized understanding.  Treatment initiated w/ therex as listed f/b neuro re-ed for improved static/ dynamic balance w/ UE involvement.  Pt performed activities on incline and decline surface to simulate home and outside environment.  Pt provided w/ contact guard assist for improved patient safety.  No signs of distress or adverse reactions observed during, and/ or following tx.  Pt continues to benefit from therapy services, and will be progressed as tolerated to address goals, improve strength, and reduce fall risk.  Continue w/ PT's POC.       Timed:         Manual Therapy:         mins  04029;     Therapeutic Exercise:  30     mins  61074;     Neuromuscular Fatuma:    24    mins  85530;    Therapeutic Activity:          mins  08772;     Gait Training:           mins  99397;     Ultrasound:          mins  99635;    Ionto                                   mins   63337  Self Care                            mins   75081  Canalith Repos         mins 66125      Un-Timed:  Electrical Stimulation:         mins  81510 (  );  Dry Needling          mins self-pay  Traction          mins 04314      Timed Treatment:  54    mins   Total Treatment:     54   mins    Yanci Garcia. ANDREA Hu  [place] License: [id number]

## 2022-08-02 ENCOUNTER — TREATMENT (OUTPATIENT)
Dept: PHYSICAL THERAPY | Facility: CLINIC | Age: 37
End: 2022-08-02

## 2022-08-02 DIAGNOSIS — R53.1 WEAKNESS: Primary | ICD-10-CM

## 2022-08-02 PROCEDURE — 97112 NEUROMUSCULAR REEDUCATION: CPT | Performed by: PHYSICAL THERAPIST

## 2022-08-02 PROCEDURE — 97110 THERAPEUTIC EXERCISES: CPT | Performed by: PHYSICAL THERAPIST

## 2022-08-02 NOTE — PROGRESS NOTES
Physical Therapy Daily Treatment Note      Patient: Steve Davidson   : 1985  Referring practitioner: Radha Moss DO  Date of Initial Visit: Type: THERAPY  Noted: 6/15/2022  Today's Date: 2022  Patient seen for 16 sessions       Visit Diagnoses:    ICD-10-CM ICD-9-CM   1. Weakness  R53.1 780.79       Subjective:  Patient arrives to therapy today w/ family.  Pt states of no new concerns/ complaints prior to tx.      Objective   See Exercise, Manual, and Modality Logs for complete treatment.       Assessment/Plan:  Patient demonstrated good effort during today's session and was provided w/ rest breaks as needed due to fatigue.  Treatment consisted of therex as listed f/b neuro re-ed for improved static/ dynamic balance.  Exercise progressed w/ resistance of ankle cuff weights increased from 2.5 to 3 pounds with LAQ's and seated marches, as well as LE bike initiated.  Pt observed w/ some fatigue, however noted tolerable.  Pt provided w/ contact guard to min assist during higher level balance activities secondary to occasional postural sway.  Pt continues to benefit from therapy services, and will be progressed as tolerated to address goals, improve strength, and reduce fall risk.  No signs of distress or adverse reactions observed during, and/ or following tx.  Continue w/ PT's POC.       Timed:         Manual Therapy:         mins  95968;     Therapeutic Exercise:   32      mins  61289;     Neuromuscular Fatuma:    24    mins  46725;    Therapeutic Activity:          mins  53878;     Gait Training:           mins  69676;     Ultrasound:          mins  03824;    Ionto                                   mins   73209  Self Care                            mins   11711  Canalith Repos         mins 08078      Un-Timed:  Electrical Stimulation:         mins  43194 ( );  Dry Needling          mins self-pay  Traction          mins 47219      Timed Treatment:  56    mins   Total Treatment:    56     karon Garcia. Mayte, PTA  [place] License: [id number]

## 2022-08-04 ENCOUNTER — TELEPHONE (OUTPATIENT)
Dept: PHYSICAL THERAPY | Facility: CLINIC | Age: 37
End: 2022-08-04

## 2022-08-09 ENCOUNTER — TREATMENT (OUTPATIENT)
Dept: PHYSICAL THERAPY | Facility: CLINIC | Age: 37
End: 2022-08-09

## 2022-08-09 DIAGNOSIS — R53.1 WEAKNESS: Primary | ICD-10-CM

## 2022-08-09 PROCEDURE — 97110 THERAPEUTIC EXERCISES: CPT | Performed by: PHYSICAL THERAPIST

## 2022-08-09 PROCEDURE — 97112 NEUROMUSCULAR REEDUCATION: CPT | Performed by: PHYSICAL THERAPIST

## 2022-08-09 NOTE — PROGRESS NOTES
Physical Therapy Treatment/Discharge  Patient: Steve Davidson   : 1985  Diagnosis/ICD-10 Code:  Weakness [R53.1]  Referring practitioner: Radha Moss DO  Date of Initial Visit: Type: THERAPY  Noted: 6/15/2022  Today's Date: 2022  Patient seen for 17 sessions         Visit Diagnoses:    ICD-10-CM ICD-9-CM   1. Weakness  R53.1 780.79         Steve Davidson reports: He will be going to see neurologist in September.  Patient notes that he can tell his balance and strength has improved with therapy, but he feels off balance when he gets tired.    Clinical Progress: improved  Home Program Compliance: Yes    Subjective       Objective          Neurological Testing     Sensation     Lumbar   Left   Intact: light touch    Right   Diminished: light touch    Strength/Myotome Testing     Left Hip   Planes of Motion   Flexion: 4+    Right Hip   Planes of Motion   Flexion: 4    Left Knee   Flexion: 4+  Extension: 4+    Right Knee   Flexion: 4+  Extension: 4    Left Ankle/Foot   Dorsiflexion: 4+  Plantar flexion: 4+    Right Ankle/Foot   Dorsiflexion: 4  Plantar flexion: 4+    Ambulation     Observational Gait   Decreased right stance time and right step length.     Quality of Movement During Gait     Additional Quality of Movement During Gait Details  Decreased foot clearance on right LE during ambulation.      Functional Assessment     Single Leg Stance - Eyes Open   Left  Trial 1: 10 seconds    Right  Trial 1: 10 seconds    Comments  Semi-tandem stance: 30 seconds            Assessment & Plan     Assessment    Assessment details: Patient has been attending physical therapy for weakness; he has attended therapy for a total of 17 sessions, dated from 6/15/2022 to 2022.  Patient will be discharged, due to reaching maximum level of function at this time.  Patient has met 3/3 STGs and 4/5 LTGs.  He is encouraged to continue with HEP following discharge from therapy.    Goals  Plan Goals: Short Term Goals: 4  weeks  1. Pt will perform bilateral SLS for 5 seconds for improved balance.  Met-10 seconds bilaterally  2. Pt will score at least 36/56 on the Rader Balance Scale for decreased risk of fall.  Met  3. Pt will perform sit<>stand transfers with minimal use of upper extremities to allow for improved independence.  Met    Long Term Goals: 12 weeks  1. Pt will perform bilateral SLS for 10 seconds for improved balance.  Met  2. Pt will score at least 42/56 on the Rader Balance Scale for decreased risk of fall.  Met  3. Bilateral LE strength will improve to at least 4/5 for improved safety with ambulation on unlevel surfaces.  Met  4. Patient will be able to achieve tandem stance and maintain balance for 30 seconds to show improved balance.  Not met  5. Patient will ambulate with least restrictive assistive device and improved foot clearance for improved safety with ambulation.  Met        Plan  Therapy options: will not be seen for skilled therapy services  Treatment plan discussed with: patient  Plan details: Patient to be discharged at conclusion of today's session.             Recommendations: Discharge      Timed:         Manual Therapy:         mins  68144;     Therapeutic Exercise:    29     mins  46528;     Neuromuscular Fatuma:  25      mins  88909;    Therapeutic Activity:          mins  73814;     Gait Training:           mins  06551;     Ultrasound:          mins  86059;    Ionto                                   mins   62796  Self Care                            mins   90754  Canalith Repos         mins 41738      Un-Timed:  Electrical Stimulation:         mins  68203 ( );  Dry Needling          mins self-pay  Traction          mins 91407  Re-Eval                               mins  91499      Timed Treatment:   54   mins   Total Treatment:     54   mins          PT: Meagan Mendoza PT     [place] License:  [id number]    Electronically signed by Meagan Mendoza PT, 08/09/22, 8:44 AM EDT    Certification  Period: 8/9/2022 thru 11/6/2022  I certify that the therapy services are furnished while this patient is under my care.  The services outlined above are required by this patient, and will be reviewed every 90 days.         Physician Signature:__________________________________________________    PHYSICIAN: Radha Moss DO  NPI: [id number]                                      DATE:  :     Please sign and return via fax to [email or web address] . Thank you, [facility] Physical Therapy

## 2022-08-11 ENCOUNTER — TREATMENT (OUTPATIENT)
Dept: PHYSICAL THERAPY | Facility: CLINIC | Age: 37
End: 2022-08-11

## 2022-08-11 DIAGNOSIS — M25.641 JOINT STIFFNESS OF HAND, RIGHT: ICD-10-CM

## 2022-08-11 DIAGNOSIS — R27.9 LACK OF COORDINATION: ICD-10-CM

## 2022-08-11 DIAGNOSIS — R29.898 UPPER EXTREMITY WEAKNESS: ICD-10-CM

## 2022-08-11 DIAGNOSIS — R53.1 WEAKNESS: Primary | ICD-10-CM

## 2022-08-11 PROCEDURE — 97530 THERAPEUTIC ACTIVITIES: CPT | Performed by: OCCUPATIONAL THERAPIST

## 2022-08-11 PROCEDURE — 97110 THERAPEUTIC EXERCISES: CPT | Performed by: OCCUPATIONAL THERAPIST

## 2022-08-11 NOTE — PROGRESS NOTES
Occupational Therapy Discharge and Daily Treatment Note    Patient: Steve Davidson   : 1985  Diagnosis/ICD-10 Code:  Weakness [R53.1]  Referring practitioner: Radha Moss DO  Date of Initial Visit: 2022  Today's Date: 2022  Patient seen for 7 session         Visit Diagnoses:    ICD-10-CM ICD-9-CM   1. Weakness  R53.1 780.79   2. Upper extremity weakness  R29.898 729.89   3. Joint stiffness of hand, right  M25.641 719.54   4. Lack of coordination  R27.9 781.3               Subjective Evaluation    History of Present Illness  Date of onset: 2022  Mechanism of injury: Sudden loss of right UE strength resulting in an ER visit and a brief hospital stay from 22-22. Iinitally CVA was considered but then ruled out and hemiplegic migraines were considered.     Subjective comment: Pt states sorry I am a little late I had to drop the kids off at school, it is thier first day back.   Patient Occupation: [employer] indoor     Precautions and Work Restrictions: Currently off workPain  No pain reported  Location: right UE  Aggravating factors: repetitive movement and overhead activity    Social Support  Lives in: trailer  Lives with: significant other    Hand dominance: right    Treatments  Current treatment: physical therapy  Discharged from (in last 30 days): inpatient hospitalization  Patient Goals  Patient goals for therapy: improved balance, increased motion, increased strength and independence with ADLs/IADLs             Objective          Observations     Left Wrist/Hand   Positive for trophic changes.     Right Wrist/Hand   Positive for trophic changes.       Neurological Testing     Additional Neurological Details  Pt reports right whole hand numbness at times    Active Range of Motion   Left Shoulder   Normal active range of motion    Right Shoulder   Normal active range of motion    Left Elbow   Normal active range of motion    Right Elbow   Normal active range of  motion    Left Wrist   Normal active range of motion    Right Wrist   Normal active range of motion    Right Thumb   Opposition: Kapandji Opposition Scale: 9/10 (6/16/22) > 10/10 (7/14/22)      Additional Active Range of Motion Details      Right: WFL AROM range but slow stiff movement > WFL but pt reports fatigue and random spasms during prolonged tasks. (8/11/22).    Passive Range of Motion   Left Shoulder   Normal passive range of motion    Right Shoulder   Normal passive range of motion    Strength/Myotome Testing     Left Shoulder   Normal muscle strength    Right Shoulder     Planes of Motion   Flexion: 4-   Extension: 4-   Abduction: 4-   Adduction: 4-     Left Elbow   Normal strength    Right Elbow   Flexion: 3  Extension: 3    Additional Strength Details  Left  strength (2nd hand position) 74 lbs (6/16/22) > 66 pounds (7/14/22)  Right  strength (2nd hand position) 42 lbs (6/16/22) > 25 pounds (7/14/22)    5 handle  test:    Right (1-5) = 24; 41; 43; 39; 35 (07/21/22)  > 20,35,33,29,27 (07/28/22)  Left (1-5)    = 43; 59; 72; 58; 44 (07/21/22) > 40,67,76,68,54 (07/28/22)                 Tests     Right Wrist/Hand   Negative extrinsic extensor tightness, extrinsic flexor tightness and intrinsic muscle tightness.     Ambulation   Weight-Bearing Status   Assistive device used: rollator walker with seat    Functional Assessment     Comments  UE Coordination Testing:    Box and Blocks:              Left=40 (6/16/22) > 40 (7/14/22) > 42 (8/11/22)              Right=37 (6/16/22) > 30  (7/14/22) > 39 (8/11/22)  9-Hole-Peg:              Left=26.38 (6/16/22) > 22.61  (7/14/22) > 21.63 (8/11/22)              Right=30.69 (6/16/22) > 30.03 > 22.43 (8/11/22)    Kapandji Opposition Scale: 9/10 (6/16/22) >10/10 (7/14/22)                    Assessment & Plan     Assessment  Impairments: abnormal coordination, abnormal muscle tone, abnormal or restricted ROM, activity intolerance, impaired balance, impaired  physical strength, lacks appropriate home exercise program and safety issue    Assessment details: Pt completed updated BUE testing including coordination and strength as well as extensive OT plan of care discussion and education. Pt demonstrates improved fine motor skills with right UE now scoring nearly equal with left. Pt gross motor scores have generally remained the same this past month. Pt demonstrates improved right UE strength. Pt reports that primary UE limitations are due to sporadic spasms in right UE during tasks specially prolonged tasks. Pt reports that neurology appointment has been moved. Based on results of testing and plan of care discussion. Pt and OT agree that pt has achieved maximal benefit from OT treatment at this time and should continue with HEP to maintain and even possibly slightly improve current functional status. Pt reports good verbal return with no concerns and reports a possibly return to therapy in the future when hopefully more information is discovered at neurology appointment.   Prognosis: good  Prognosis details: Pt made good progress with OT treatment but appears to be at maximal OT benefit at this time.    Goals  Plan Goals: 08/11/22   OT Short Term Goals    STG 1 Pt will demonstrate good return on beginning HEP  STG 1 Progress Met  STG 2 Pt will improve right grasp strength by 20 pounds to improve ADL independence.  STG 2 Progress Not Met  STG 3 Pt will improve right UE fine motor skills with 5 second improvement on 9-hole peg test to improve ADL independence  STG 3 Progress Met  STG 4 Pt will right UE strength to 4/5 MMT for increased ADL independence  STG 4 Progress Partially Met  STG 5 Pt will improve QuickDASH score by 20 point to improve ADL independence  STG 5 Progress Not Met    Long Term Goals    LTG 1 Pt will demonstrate good return on beginning HEP  LTG 1 Progress Met  LTG 2 Pt will improve right grasp strength by 40 pouinds to improve ADL independence.  LTG 2  Progress Not Met  LTG 3 Pt will improve right UE fine motor skills with 10 second improvement on 9-hole peg test to improve ADL independence  LTG 3 Progress Partially Met  LTG 4 Pt will right UE strength to 5/5 MMT for increased ADL independence  LTG 4 Progress Not Met  LTG 5 Pt will improve QuickDASH score by 50 points to improve ADL independence  LTG 5 Progress Not Met      Plan  Treatment plan discussed with: patient  Plan details: D/C OT treatment this date              CPT Code CPT Description Minutes/Units Provided   37920 OT Evaluation, Low     34253 OT Evaluation, Moderate     21563 OT Evaluation, High      OT Re-evaluation          27343 OT Therapeutic Exercise 15   84436 OT Therapeutic Activity 23   38317 OT Neuromuscular Re-education    67830 OT Manual Therapy    57758 OT Orthotic Fit and Train         51064 OT Attended e-stim      OT Unattended e-stim    77422  OT Moist heat/cryotherapy    15616 OT Ultrasound Minutes    30613 OT Iontophoresis    23395 OT Paraffin         Other: Other:       Total Timed Minutes: 38          OT: Kenny D Maynes, OTR/L, [place]     [place] License #[id number]  NPI #[id number]  Electronically signed by: Kenny D. Maynes, OTR/L, ORQUIDEA 8/11/2022

## 2022-09-12 ENCOUNTER — TREATMENT (OUTPATIENT)
Dept: PHYSICAL THERAPY | Facility: CLINIC | Age: 37
End: 2022-09-12

## 2022-09-12 ENCOUNTER — TRANSCRIBE ORDERS (OUTPATIENT)
Dept: PHYSICAL THERAPY | Facility: CLINIC | Age: 37
End: 2022-09-12

## 2022-09-12 DIAGNOSIS — G81.91 RIGHT HEMIPARESIS: Primary | ICD-10-CM

## 2022-09-12 DIAGNOSIS — R26.9 GAIT DISTURBANCE: ICD-10-CM

## 2022-09-12 PROCEDURE — 97162 PT EVAL MOD COMPLEX 30 MIN: CPT | Performed by: PHYSICAL THERAPIST

## 2022-09-12 NOTE — PROGRESS NOTES
"    Physical Therapy Initial Evaluation and Plan of Care    Patient: Steve Davidson   : 1985  Diagnosis/ICD-10 Code:  Right hemiparesis (HCC) [G81.91]  Referring practitioner: Radha Olivo MD  Date of Initial Visit: 2022  Today's Date: 2022  Patient seen for 1 session         Visit Diagnoses:    ICD-10-CM ICD-9-CM   1. Right hemiparesis (HCC)  G81.91 342.90   2. Gait disturbance  R26.9 781.2         [id number]      Subjective Evaluation    History of Present Illness  Date of onset: 2022  Mechanism of injury: Pt reports being hospitalized on 22 due to sudden right sided weakness. He states the doctors initially thought he had a stroke in the emergency room and states they then thought it was a migraine. He reports he spent four days in the hospital and still has no answers despite MRIs and imaging. The patient states he participated in PT and OT for approximately a month and a half for improved strength and balance. He reports he continued to have good days and bad days. The patient states he was discharged secondary to making improvements and not having a diagnosis, stating he was discharged until he was seen by a neurologist. The patient states he saw a neurologist on 22 who reported \"everything is good neurologically\". He reports the neurologist mentioned seeing a specialist for \"movement disorder\" and was also instructed to return to physical therapy. He states he has always been a very active person but is unable to participate in normal activities due to weakness. The patient reports some days he wakes up feeling good with no pain but then other days wakes up \"very weak like a baby deer\". He states he is currently getting short term disability. The patient reports he has an appointment scheduled with his family doctor to address anxiety and depression, stating he is currently not on medication.      Patient Occupation: [employer] -  Quality of life: good    Pain  Current pain " "ratin  At best pain ratin  At worst pain ratin  Location: Back, feet  Quality: needle-like, knife-like, dull ache and radiating  Alleviating factors: \"Nothing consistent\"  Aggravating factors: prolonged positioning, ambulation, squatting, outstretched reach, repetitive movement, standing, movement, lifting, overhead activity, stairs and sleeping  Progression: no change    Social Support  Lives in: trailer (2 steps into trailer, hand rails on both sides)  Lives with: young children and significant other    Hand dominance: right    Diagnostic Tests  MRI studies: normal    Treatments  Previous treatment: physical therapy (Occupational therapy)  Patient Goals  Patient goals for therapy: decreased pain, improved balance, increased motion, increased strength, independence with ADLs/IADLs, return to sport/leisure activities and return to work             Objective          Neurological Testing     Sensation     Lumbar   Left   Intact: light touch    Right   Intact: light touch    Reflexes   Left   Patellar (L4): normal (2+)  Achilles (S1): normal (2+)  Clonus sign: negative    Right   Patellar (L4): normal (2+)  Achilles (S1): normal (2+)  Clonus sign: negative    Strength/Myotome Testing     Left Hip   Planes of Motion   Flexion: 4+  Abduction: 4-  Adduction: 4    Right Hip   Planes of Motion   Flexion: 3+  Abduction: 4-  Adduction: 3+    Left Knee   Flexion: 4+  Extension: 4+    Right Knee   Flexion: 4-  Extension: 4-    Left Ankle/Foot   Dorsiflexion: 4    Right Ankle/Foot   Dorsiflexion: 3+          Assessment & Plan     Assessment  Impairments: abnormal coordination, abnormal gait, abnormal muscle firing, abnormal muscle tone, abnormal or restricted ROM, activity intolerance, impaired balance, impaired physical strength, lacks appropriate home exercise program, pain with function, safety issue and weight-bearing intolerance  Functional Limitations: carrying objects, lifting, sleeping, walking, pulling, " pushing, uncomfortable because of pain, moving in bed, sitting, standing, stooping, reaching behind back, reaching overhead and unable to perform repetitive tasks  Assessment details: The patient is a 36 year old male presenting to the clinic with bilateral lower extremity weakness, impaired balance, and impaired gait. He ambulated with decreased stride length as well as right lower extremity buckling with gait. Impaired balance was demonstrated with patient scoring 32/56 on the Rader Balance Scale. Contact guard assist was provided with balance activities for improved safety. The patient would benefit from skilled physical therapy to address functional limitations and impairments. He will be progressed as indicated to achieve max function.  Prognosis: fair    Goals  Plan Goals: Short Term Goals: 4 weeks  1. Pt will perform bilateral SLS for 5 seconds for improved balance.  2. Pt will score at least 36/56 on the Rader Balance Scale for decreased risk of fall.  3. Pt will perform sit<>stand transfers with minimal use of upper extremities to allow for improved independence.    Long Term Goals: 12 weeks  1. Pt will perform bilateral SLS for 10 seconds for improved balance.  2. Pt will score at least 42/56 on the Rader Balance Scale for decreased risk of fall.  3. Bilateral LE strength will improve to at least 4/5 for improved safety with ambulation on unlevel surfaces.  4. Patient will be able to achieve tandem stance and maintain balance for 30 seconds to show improved balance.  5. Patient will ambulate with improved foot clearance for improved safety with ambulation.    Plan  Therapy options: will be seen for skilled therapy services  Planned modality interventions: cryotherapy, electrical stimulation/Russian stimulation, iontophoresis, thermotherapy (hydrocollator packs), traction and ultrasound  Planned therapy interventions: abdominal trunk stabilization, ADL retraining, balance/weight-bearing training, body  mechanics training, fine motor coordination training, flexibility, functional ROM exercises, gait training, home exercise program, joint mobilization, manual therapy, motor coordination training, neuromuscular re-education, postural training, orthotic fitting/training, soft tissue mobilization, spinal/joint mobilization, strengthening, stretching, therapeutic activities, transfer training and wheelchair management/propulsion training  Frequency: 2x week  Duration in weeks: 12  Treatment plan discussed with: patient  Plan details: Progress as indicated to achieve maximum function.    Moderate Evaluation  03957  Re-evaluation   79804    Therapeutic exercise  21562  Therapeutic activity    85955  Neuromuscular re-education   49216  Manual therapy   04463  Gait training  81443    Attended e-stim  38734  Unattended e-stim (Private)  31910  Moist heat/cryotherapy 93506   Ultrasound   85349  Iontophoresis   41497  Mechanical traction 94389              Timed:         Manual Therapy:         mins  56769;     Therapeutic Exercise:         mins  94973;     Neuromuscular Fatuma:        mins  88267;    Therapeutic Activity:          mins  68584;     Gait Training:           mins  45109;     Ultrasound:          mins  54888;    Ionto                                   mins   81789  Self Care                            mins   31228  Canalith Repos         mins 07774      Un-Timed:  Electrical Stimulation:         mins  94349 ( );  Dry Needling          mins self-pay  Traction          mins 92574  Low Eval          Mins  78180  Mod Eval     31     Mins  83621  High Eval                            Mins  88855        Timed Treatment:   0   mins   Total Treatment:     31   mins          PT: Ashley Claudene Dalton, PT     License Number: [id number]  Electronically signed by Ashley Claudene Dalton, PT, 09/12/22, 8:15 AM EDT    Certification Period: 9/12/2022 thru 12/10/2022  I certify that the therapy services are furnished while this  patient is under my care.  The services outlined above are required by this patient, and will be reviewed every 90 days.         Physician Signature:__________________________________________________    PHYSICIAN: Radha Olivo MD  NPI: [id number]                                      DATE:      Please sign and return via fax to [email or web address] . Thank you, [facility].

## 2022-09-15 ENCOUNTER — TELEPHONE (OUTPATIENT)
Dept: PHYSICAL THERAPY | Facility: CLINIC | Age: 37
End: 2022-09-15

## 2022-09-19 ENCOUNTER — TREATMENT (OUTPATIENT)
Dept: PHYSICAL THERAPY | Facility: CLINIC | Age: 37
End: 2022-09-19

## 2022-09-19 DIAGNOSIS — R53.1 WEAKNESS: ICD-10-CM

## 2022-09-19 DIAGNOSIS — G81.91 RIGHT HEMIPARESIS: Primary | ICD-10-CM

## 2022-09-19 DIAGNOSIS — R26.9 GAIT DISTURBANCE: ICD-10-CM

## 2022-09-19 PROCEDURE — 97110 THERAPEUTIC EXERCISES: CPT | Performed by: PHYSICAL THERAPIST

## 2022-09-19 PROCEDURE — 97112 NEUROMUSCULAR REEDUCATION: CPT | Performed by: PHYSICAL THERAPIST

## 2022-09-19 NOTE — PROGRESS NOTES
"Physical Therapy Daily Treatment Note      Patient: Steve Davidson   : 1985  Referring practitioner: Radha Olivo MD  Date of Initial Visit: Type: THERAPY  Noted: 2022  Today's Date: 2022  Patient seen for 2 sessions       Visit Diagnoses:    ICD-10-CM ICD-9-CM   1. Right hemiparesis (HCC)  G81.91 342.90   2. Gait disturbance  R26.9 781.2   3. Weakness  R53.1 780.79       Subjective Evaluation    History of Present Illness    Subjective comment: Pt reports no pain prior to today's session. He states he has not had any falls since his last session.Pain  Current pain ratin           Objective   See Exercise, Manual, and Modality Logs for complete treatment.       Assessment & Plan     Assessment    Assessment details: Today's session consisted of therapeutic exercises for improved bilateral lower extremity strength.  Neuromuscular re-education was performed in parallel bars for improved balance. With ball toss combined with gait the patient experienced light headedness and right lower extremity buckling. Max assist was required to prevent falls. The patient was placed in the seated position and vitals obtained with a BP of 134/101, HR 78 bpm, and O2 saturation of 98%. After five minutes of rest the patient's blood pressure was 125/99 with HR of 79 bpm and O2 of 98%. After an additional five minutes, BP was 133/103 with HR of 80 bpm. The patient was then placed in the supine position for five minutes. BP was then measured with a reading of 146/93, HR of 74 bpm, and 97% oxygen saturation. The patient reports \"feeling better\". He was then walked to the car and instructed to contact MD if he continues to experience high blood pressure and dizziness/light headedness that doesn't quickly resolve. The patient's significant other was informed of high blood pressure and symptoms. They were both educated on the importance of monitoring BP and calling MD or going to the emergency room if BP or symptoms worsen. " The patient and his significant other verbalized understanding, stating they have a BP monitor at home.    Plan  Plan details: Progress as indicated.          Timed:         Manual Therapy:         mins  80730;     Therapeutic Exercise:    15     mins  26277;     Neuromuscular Fatuma:    25    mins  74145;    Therapeutic Activity:          mins  91739;     Gait Training:           mins  38462;     Ultrasound:          mins  15155;    Ionto                                   mins   12788  Self Care                            mins   47909  Canalith Repos         mins 69210      Un-Timed:  Electrical Stimulation:         mins  77883 ( );  Dry Needling          mins self-pay  Traction          mins 13252      Timed Treatment:   40   mins   Total Treatment:     40   mins    Ashley Claudene Dalton, PT  [place] License: [id number]

## 2022-09-22 ENCOUNTER — TREATMENT (OUTPATIENT)
Dept: PHYSICAL THERAPY | Facility: CLINIC | Age: 37
End: 2022-09-22

## 2022-09-22 DIAGNOSIS — G81.91 RIGHT HEMIPARESIS: Primary | ICD-10-CM

## 2022-09-22 DIAGNOSIS — R53.1 WEAKNESS: ICD-10-CM

## 2022-09-22 DIAGNOSIS — R26.9 GAIT DISTURBANCE: ICD-10-CM

## 2022-09-22 PROCEDURE — 97110 THERAPEUTIC EXERCISES: CPT | Performed by: PHYSICAL THERAPIST

## 2022-09-22 PROCEDURE — 97112 NEUROMUSCULAR REEDUCATION: CPT | Performed by: PHYSICAL THERAPIST

## 2022-09-22 NOTE — PROGRESS NOTES
"Physical Therapy Daily Treatment Note      Patient: Steve Davidson   : 1985  Referring practitioner: Radha Olivo MD  Date of Initial Visit: Type: THERAPY  Noted: 2022  Today's Date: 2022  Patient seen for 3 sessions       Visit Diagnoses:    ICD-10-CM ICD-9-CM   1. Right hemiparesis (HCC)  G81.91 342.90   2. Gait disturbance  R26.9 781.2   3. Weakness  R53.1 780.79       Subjective Evaluation    History of Present Illness    Subjective comment: Pt reports \"normal pain\" prior to today's session. He states he has not had any falls since his last session. The patient reports he did not have any issues with BP following his last session and states \"it's been doing good\".       Objective   See Exercise, Manual, and Modality Logs for complete treatment.       Assessment & Plan     Assessment    Assessment details: The patient tolerated today's session with no reports of dizziness or light headedness. Rest breaks were provided as needed secondary to fatigue. Neuromuscular re-education was performed in the parallel bars with activities performed on air-ex and with various bases of support. Min assist was required during neuromuscular re-education. UE activities were incorporated to address coordination. Therapeutic exercises were performed to address bilateral lower extremity strength. The patient reported no increase in pain throughout today's session.    Plan  Plan details: Progress as indicated for improved balance, gait, strength, and function.          Timed:         Manual Therapy:         mins  12808;     Therapeutic Exercise:    25     mins  24683;     Neuromuscular Fatuma:   28    mins  77515;    Therapeutic Activity:          mins  11842;     Gait Training:           mins  32826;     Ultrasound:          mins  00003;    Ionto                                   mins   99270  Self Care                            mins   59028  Canalith Repos         mins 92343      Un-Timed:  Electrical Stimulation:      "    mins  44698 ( );  Dry Needling          mins self-pay  Traction          mins 78071      Timed Treatment:   53   mins   Total Treatment:     53   mins    Ashley Claudene Dalton, PT  [place] License: [id number]

## 2022-09-26 ENCOUNTER — TREATMENT (OUTPATIENT)
Dept: PHYSICAL THERAPY | Facility: CLINIC | Age: 37
End: 2022-09-26

## 2022-09-26 DIAGNOSIS — R53.1 WEAKNESS: ICD-10-CM

## 2022-09-26 DIAGNOSIS — G81.91 RIGHT HEMIPARESIS: Primary | ICD-10-CM

## 2022-09-26 DIAGNOSIS — R26.9 GAIT DISTURBANCE: ICD-10-CM

## 2022-09-26 PROCEDURE — 97110 THERAPEUTIC EXERCISES: CPT | Performed by: PHYSICAL THERAPIST

## 2022-09-26 PROCEDURE — 97112 NEUROMUSCULAR REEDUCATION: CPT | Performed by: PHYSICAL THERAPIST

## 2022-09-26 NOTE — PROGRESS NOTES
"Physical Therapy Daily Treatment Note      Patient: Steve Davidson   : 1985  Referring practitioner: Radha Olivo MD  Date of Initial Visit: Type: THERAPY  Noted: 2022  Today's Date: 2022  Patient seen for 4 sessions       Visit Diagnoses:    ICD-10-CM ICD-9-CM   1. Right hemiparesis (HCC)  G81.91 342.90   2. Gait disturbance  R26.9 781.2   3. Weakness  R53.1 780.79       Subjective Evaluation    History of Present Illness    Subjective comment: The patient reports \"my normal aching pain\" and no falls prior to today's session.       Objective   See Exercise, Manual, and Modality Logs for complete treatment.       Assessment & Plan     Assessment    Assessment details: Today's session was progressed to include SLS on air-ex to further challenge impaired balance. The patient demonstrated improved stability and duration with SLS on the left compared to right. Therapeutic exercises were performed for improved bilateral lower extremity strength and sore stability. CGA/min assist was provided with neuromuscular re-education for improved safety secondary to impaired balance. Rest breaks were provided as needed secondary to fatigue. The patient reported no dizziness or lightheadedness prior to, throughout, or following today's session. He stated his BP has been doing better.    Plan  Plan details: Progress as indicated to achieve max gain.          Timed:         Manual Therapy:         mins  58569;     Therapeutic Exercise:    27     mins  65558;     Neuromuscular Fatuma:    29    mins  24961;    Therapeutic Activity:          mins  69490;     Gait Training:           mins  59945;     Ultrasound:          mins  52545;    Ionto                                   mins   51674  Self Care                            mins   34110  Canalith Repos         mins 31842      Un-Timed:  Electrical Stimulation:         mins  78211 ( );  Dry Needling          mins self-pay  Traction          mins 59170      Timed " Treatment:   56   mins   Total Treatment:     56   mins    Ashley Claudene Dalton, PT  [place] License: [id number]

## 2022-09-29 ENCOUNTER — TELEPHONE (OUTPATIENT)
Dept: PHYSICAL THERAPY | Facility: CLINIC | Age: 37
End: 2022-09-29

## 2022-10-06 ENCOUNTER — TELEPHONE (OUTPATIENT)
Dept: PHYSICAL THERAPY | Facility: CLINIC | Age: 37
End: 2022-10-06

## 2022-10-06 NOTE — TELEPHONE ENCOUNTER
Patient contacted due to failure to attend. Patient states he feels worse following PT sessions and has reached out to his neurologist. He reports he wishes to wait until he speaks with him before returning to PT>

## 2022-10-10 ENCOUNTER — TREATMENT (OUTPATIENT)
Dept: PHYSICAL THERAPY | Facility: CLINIC | Age: 37
End: 2022-10-10

## 2022-10-10 DIAGNOSIS — R26.9 GAIT DISTURBANCE: ICD-10-CM

## 2022-10-10 DIAGNOSIS — G81.91 RIGHT HEMIPARESIS: Primary | ICD-10-CM

## 2022-10-10 DIAGNOSIS — R53.1 WEAKNESS: ICD-10-CM

## 2022-10-10 PROCEDURE — 97110 THERAPEUTIC EXERCISES: CPT | Performed by: PHYSICAL THERAPIST

## 2022-10-10 PROCEDURE — 97112 NEUROMUSCULAR REEDUCATION: CPT | Performed by: PHYSICAL THERAPIST

## 2022-10-10 NOTE — PROGRESS NOTES
"Physical Therapy Daily Treatment Note      Patient: Steve Davidson   : 1985  Referring practitioner: Radha Olivo MD  Date of Initial Visit: Type: THERAPY  Noted: 2022  Today's Date: 10/10/2022  Patient seen for 5 sessions       Visit Diagnoses:    ICD-10-CM ICD-9-CM   1. Right hemiparesis (HCC)  G81.91 342.90   2. Gait disturbance  R26.9 781.2   3. Weakness  R53.1 780.79       Subjective Evaluation    History of Present Illness    Subjective comment:  Pt reports \"doing bad\" prior to today's session. He states on 10/6/22 he started not feeling well at his desk and began having a lot of pressure in his lower back. The patient reports he had hardly any movement and was able to sit back down. He states he was unable to get up with the neurologist so he was driven to . The patient reports the MD \"was not very helpful\" and did not run any tests. He states he fell coming in the house due to being unable to really move his legs.        Objective   See Exercise, Manual, and Modality Logs for complete treatment.       Assessment & Plan     Assessment    Assessment details: Therapeutic exercises were performed for improved bilateral lower extremity strength. Increased rest breaks were provided secondary to fatigue. Standing exercises were limited due to weakness and knee buckling. Due to knee buckling the patient was transported from gym to car in a wheelchair. A gait belt was used for standing activities for improved safety.    Plan  Plan details: Progress as tolerated for improved mobility and function.          Timed:         Manual Therapy:         mins  57198;     Therapeutic Exercise:    26     mins  03361;     Neuromuscular Fatuma:  14      mins  25510;    Therapeutic Activity:          mins  98263;     Gait Training:           mins  67192;     Ultrasound:          mins  99503;    Ionto                                   mins   14736  Self Care                            mins   35919  Canalith Repos         " mins 84391      Un-Timed:  Electrical Stimulation:         mins  05021 ( );  Dry Needling          mins self-pay  Traction          mins 13097      Timed Treatment:   40   mins   Total Treatment:     40   mins    Ashley Claudene Dalton, PT  [place] License: [id number]

## 2022-10-13 ENCOUNTER — TELEPHONE (OUTPATIENT)
Dept: PHYSICAL THERAPY | Facility: CLINIC | Age: 37
End: 2022-10-13

## 2022-10-13 NOTE — TELEPHONE ENCOUNTER
Called patient to schedule put back on schedule for pt. No answer left message to call back and reschedule.

## 2022-10-20 ENCOUNTER — TREATMENT (OUTPATIENT)
Dept: PHYSICAL THERAPY | Facility: CLINIC | Age: 37
End: 2022-10-20

## 2022-10-20 DIAGNOSIS — R53.1 WEAKNESS: ICD-10-CM

## 2022-10-20 DIAGNOSIS — G81.91 RIGHT HEMIPARESIS: Primary | ICD-10-CM

## 2022-10-20 DIAGNOSIS — R26.9 GAIT DISTURBANCE: ICD-10-CM

## 2022-10-20 PROCEDURE — 97112 NEUROMUSCULAR REEDUCATION: CPT | Performed by: PHYSICAL THERAPIST

## 2022-10-20 PROCEDURE — 97110 THERAPEUTIC EXERCISES: CPT | Performed by: PHYSICAL THERAPIST

## 2022-10-20 NOTE — PROGRESS NOTES
"Physical Therapy Daily Treatment Note      Patient: Steve Davidson   : 1985  Referring practitioner: Radha Olivo MD  Date of Initial Visit: Type: THERAPY  Noted: 2022  Today's Date: 10/20/2022  Patient seen for 6 sessions       Visit Diagnoses:    ICD-10-CM ICD-9-CM   1. Right hemiparesis (HCC)  G81.91 342.90   2. Gait disturbance  R26.9 781.2   3. Weakness  R53.1 780.79       Subjective Evaluation    History of Present Illness    Subjective comment: Pt reports 0/10 pain prior to today's session. He states he is \"pretty good today\".Pain  Current pain ratin           Objective   See Exercise, Manual, and Modality Logs for complete treatment.       Assessment & Plan     Assessment    Assessment details: The patient demonstrated increased tolerance to today's activity, with decreased fatigue and knee buckling present. Increased neuromuscular re-education activities were performed for improved balance. Neuromuscular re-education activities were performed with a variety of bases of support as well as air-ex mats with UE activities. Rest breaks were provided as needed secondary to fatigue. Therapeutic exercises were performed for improved bilateral lower extremity strength. Therapeutic activities were progressed to include 4# ankle weights rather than 3# used at previous sessions.    Plan  Plan details: Progress as indicated for improved strength, balance, and gait.          Timed:         Manual Therapy:         mins  30790;     Therapeutic Exercise:    24     mins  44240;     Neuromuscular Fatuma:    23    mins  81065;    Therapeutic Activity:          mins  47938;     Gait Training:           mins  49700;     Ultrasound:          mins  89953;    Ionto                                   mins   72401  Self Care                            mins   18219  Canalith Repos         mins 70627      Un-Timed:  Electrical Stimulation:         mins  87491 ( );  Dry Needling          mins self-pay  Traction         "  mins 45707      Timed Treatment:   47   mins   Total Treatment:     47   mins    Ashley Claudene Dalton, PT  [place] License: [id number]

## 2022-10-25 ENCOUNTER — TELEPHONE (OUTPATIENT)
Dept: PHYSICAL THERAPY | Facility: CLINIC | Age: 37
End: 2022-10-25

## 2022-10-27 ENCOUNTER — TELEPHONE (OUTPATIENT)
Dept: PHYSICAL THERAPY | Facility: CLINIC | Age: 37
End: 2022-10-27

## 2022-11-01 ENCOUNTER — TREATMENT (OUTPATIENT)
Dept: PHYSICAL THERAPY | Facility: CLINIC | Age: 37
End: 2022-11-01

## 2022-11-01 DIAGNOSIS — G81.91 RIGHT HEMIPARESIS: Primary | ICD-10-CM

## 2022-11-01 DIAGNOSIS — R26.9 GAIT DISTURBANCE: ICD-10-CM

## 2022-11-01 DIAGNOSIS — R53.1 WEAKNESS: ICD-10-CM

## 2022-11-01 PROCEDURE — 97112 NEUROMUSCULAR REEDUCATION: CPT | Performed by: PHYSICAL THERAPIST

## 2022-11-01 PROCEDURE — 97110 THERAPEUTIC EXERCISES: CPT | Performed by: PHYSICAL THERAPIST

## 2022-11-01 NOTE — PROGRESS NOTES
Physical Therapy Treatment/Discharge  Patient: Steve Davidson   : 1985  Diagnosis/ICD-10 Code:  Right hemiparesis (HCC) [G81.91]  Referring practitioner: Radha Olivo MD  Date of Initial Visit: Type: THERAPY  Noted: 2022  Today's Date: 2022  Patient seen for 7 sessions         Visit Diagnoses:    ICD-10-CM ICD-9-CM   1. Right hemiparesis (HCC)  G81.91 342.90   2. Gait disturbance  R26.9 781.2   3. Weakness  R53.1 780.79         Objective Measures: FOWLER  Clinical Progress: improved  Home Program Compliance: Yes      Subjective Evaluation    History of Present Illness    Subjective comment: Patient reports that he feels like he has gotten stronger and more mobility, since he started taking neurontin.  Patient reports that he feels like he is close to being ready to discharge.         Objective          Strength/Myotome Testing     Left Hip   Planes of Motion   Flexion: 4+  Abduction: 4  Adduction: 4    Right Hip   Planes of Motion   Flexion: 4  Abduction: 4  Adduction: 4    Left Knee   Flexion: 4+  Extension: 4+    Right Knee   Flexion: 4  Extension: 4    Left Ankle/Foot   Dorsiflexion: 4+    Right Ankle/Foot   Dorsiflexion: 4          Assessment & Plan     Assessment  Impairments: abnormal coordination, abnormal gait, abnormal muscle firing, abnormal muscle tone, abnormal or restricted ROM, activity intolerance, impaired balance, impaired physical strength, lacks appropriate home exercise program, pain with function, safety issue and weight-bearing intolerance    Assessment details: Patient has been attending physical therapy for bilateral lower extremity weakness, impaired balance, and impaired gait; he has attended therapy for a total of 7 sessions.  Patient will be discharged at this time, due to meeting all goals.  Patient is agreeable to discharge and notes that he plans to discuss further treatment options when he has appt with movement specialist on 2022.  Patient is encouraged to  continue with HEP following discharge from therapy.      Goals  Plan Goals: Short Term Goals: 4 weeks  1. Pt will perform bilateral SLS for 5 seconds for improved balance.  Met  2. Pt will score at least 36/56 on the Rader Balance Scale for decreased risk of fall.  Met-44/56  3. Pt will perform sit<>stand transfers with minimal use of upper extremities to allow for improved independence.  Met    Long Term Goals: 12 weeks  1. Pt will perform bilateral SLS for 10 seconds for improved balance.  Met  2. Pt will score at least 42/56 on the Rader Balance Scale for decreased risk of fall.  Met  3. Bilateral LE strength will improve to at least 4/5 for improved safety with ambulation on unlevel surfaces.  Met  4. Patient will be able to achieve tandem stance and maintain balance for 30 seconds to show improved balance.  Met  5. Patient will ambulate with improved foot clearance for improved safety with ambulation.  Met    Plan  Therapy options: will not be seen for skilled therapy services  Treatment plan discussed with: patient  Plan details: Patient to be discharged at conclusion of treatment session.           Recommendations: Discharge      Timed:         Manual Therapy:         mins  77609;     Therapeutic Exercise:    25     mins  23025;     Neuromuscular Fatuma:    25    mins  76687;    Therapeutic Activity:          mins  29326;     Gait Training:           mins  45165;     Ultrasound:          mins  11147;    Ionto                                   mins   72647  Self Care                            mins   54081  Canalith Repos         mins 34828      Un-Timed:  Electrical Stimulation:         mins  48829 ( );  Dry Needling          mins self-pay  Traction          mins 39030  Re-Eval                               mins  80379      Timed Treatment:   50   mins   Total Treatment:     50   mins          PT: Meagan Mendoza PT     [place] License:  [id number]    Electronically signed by Meagan Mendoza PT, 11/01/22,  9:01 AM EDT    Certification Period: 11/1/2022 thru 1/29/2023  I certify that the therapy services are furnished while this patient is under my care.  The services outlined above are required by this patient, and will be reviewed every 90 days.         Physician Signature:__________________________________________________    PHYSICIAN: Radha Olivo MD  NPI: [id number]                                      DATE:  :     Please sign and return via fax to [email or web address] . Thank you, [facility]

## 2023-03-16 ENCOUNTER — APPOINTMENT (OUTPATIENT)
Dept: GENERAL RADIOLOGY | Facility: HOSPITAL | Age: 38
End: 2023-03-16
Payer: COMMERCIAL

## 2023-03-16 ENCOUNTER — HOSPITAL ENCOUNTER (EMERGENCY)
Facility: HOSPITAL | Age: 38
Discharge: HOME | End: 2023-03-16
Admitting: STUDENT IN AN ORGANIZED HEALTH CARE EDUCATION/TRAINING PROGRAM
Payer: COMMERCIAL

## 2023-03-16 VITALS
HEART RATE: 81 BPM | WEIGHT: 185 LBS | RESPIRATION RATE: 18 BRPM | HEIGHT: 68 IN | DIASTOLIC BLOOD PRESSURE: 89 MMHG | SYSTOLIC BLOOD PRESSURE: 146 MMHG | TEMPERATURE: 97.9 F | BODY MASS INDEX: 28.04 KG/M2 | OXYGEN SATURATION: 99 %
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
## 2023-03-16 DIAGNOSIS — M79.18 MUSCULOSKELETAL PAIN: ICD-10-CM

## 2023-03-16 PROCEDURE — 99283 EMERGENCY DEPT VISIT LOW MDM: CPT

## 2023-03-16 PROCEDURE — 73562 X-RAY EXAM OF KNEE 3: CPT

## 2023-03-16 PROCEDURE — 73610 X-RAY EXAM OF ANKLE: CPT

## 2023-03-16 PROCEDURE — 73590 X-RAY EXAM OF LOWER LEG: CPT

## 2023-03-16 PROCEDURE — 73630 X-RAY EXAM OF FOOT: CPT

## 2023-03-16 RX ORDER — HYDROCODONE BITARTRATE AND ACETAMINOPHEN 7.5; 325 MG/1; MG/1
1 TABLET ORAL ONCE
Status: COMPLETED | OUTPATIENT
Start: 2023-03-16 | End: 2023-03-16

## 2023-03-16 RX ADMIN — HYDROCODONE BITARTRATE AND ACETAMINOPHEN 1 TABLET: 7.5; 325 TABLET ORAL at 21:32

## 2023-03-16 NOTE — ED PROVIDER NOTES
Subjective   History of Present Illness  37-year-old male with past medical history of anxiety, depression, and asthma presents to the emergency room with left leg pain.  Patient states he fell prior to arrival while going down some steps and since that time has had left leg pain.  He states aggravating factors include bearing weight and movement.  Denies any alleviating factors.  Denies any other injuries, complaints, or concerns at this time.    History provided by:  Patient   used: No        Review of Systems   Constitutional: Negative.  Negative for fever.   HENT: Negative.    Respiratory: Negative.    Cardiovascular: Negative.  Negative for chest pain.   Gastrointestinal: Negative.  Negative for abdominal pain.   Endocrine: Negative.    Genitourinary: Negative.  Negative for dysuria.   Musculoskeletal:        (+) Left leg injury   Skin: Negative.    Neurological: Negative.    Psychiatric/Behavioral: Negative.    All other systems reviewed and are negative.      Past Medical History:   Diagnosis Date   • Anxiety    • Asthma    • Depression        No Known Allergies    No past surgical history on file.    Family History   Problem Relation Age of Onset   • Lung disease Mother    • Heart disease Father    • Autoimmune disease Other    • Diabetes Other        Social History     Socioeconomic History   • Marital status: Single   Tobacco Use   • Smoking status: Never   • Smokeless tobacco: Current   Substance and Sexual Activity   • Alcohol use: No   • Drug use: No           Objective   Physical Exam  Vitals and nursing note reviewed.   Constitutional:       General: He is not in acute distress.     Appearance: He is well-developed. He is not diaphoretic.   HENT:      Head: Normocephalic and atraumatic.      Right Ear: External ear normal.      Left Ear: External ear normal.      Nose: Nose normal.   Eyes:      Conjunctiva/sclera: Conjunctivae normal.      Pupils: Pupils are equal, round, and  reactive to light.   Neck:      Vascular: No JVD.      Trachea: No tracheal deviation.   Cardiovascular:      Rate and Rhythm: Normal rate and regular rhythm.      Heart sounds: Normal heart sounds. No murmur heard.  Pulmonary:      Effort: Pulmonary effort is normal. No respiratory distress.      Breath sounds: Normal breath sounds. No wheezing.   Abdominal:      General: Bowel sounds are normal.      Palpations: Abdomen is soft.      Tenderness: There is no abdominal tenderness.   Musculoskeletal:         General: No deformity. Normal range of motion.      Cervical back: Normal range of motion and neck supple.      Right knee: Normal.      Left knee: Tenderness present.      Left lower leg: Tenderness present.      Left ankle: Tenderness present.      Left foot: Tenderness present.        Legs:    Skin:     General: Skin is warm and dry.      Coloration: Skin is not pale.      Findings: No erythema or rash.   Neurological:      Mental Status: He is alert and oriented to person, place, and time.      Cranial Nerves: No cranial nerve deficit.   Psychiatric:         Behavior: Behavior normal.         Thought Content: Thought content normal.         Procedures           ED Course  ED Course as of 03/16/23 2315   Thu Mar 16, 2023   2104 XR Ankle 3+ View Left [TK]   2104 XR Knee 3 View Left [TK]   2104 XR Tibia Fibula 2 View Left [TK]   2105 XR Foot 3+ View Left [TK]      ED Course User Index  [TK] Caren Marie PA-C             XR Foot 3+ View Left   Final Result   Negative left foot.      Signer Name: Kaleb Grey MD    Signed: 3/16/2023 9:02 PM    Workstation Name: [id number]     [facility]      XR Knee 3 View Left   Final Result   Suboptimal patient positioning. No acute fracture.      Signer Name: Kaleb Grey MD    Signed: 3/16/2023 8:59 PM    Workstation Name: [id number]     [facility]      XR Tibia Fibula 2 View Left   Final Result   Negative left tibia/fibula.       Signer Name: Kaleb Grey MD    Signed: 3/16/2023 8:58 PM    Workstation Name: [id number]     [facility]      XR Ankle 3+ View Left   Final Result   Negative left ankle.      Signer Name: Kaleb Grey MD    Signed: 3/16/2023 9:00 PM    Workstation Name: [id number]     [facility]                                        Medical Decision Making  37-year-old male with past medical history of anxiety, depression, and asthma presents to the emergency room with left leg pain.  Patient states he fell prior to arrival while going down some steps and since that time has had left leg pain.  He states aggravating factors include bearing weight and movement.  Denies any alleviating factors.  Denies any other injuries, complaints, or concerns at this time.    Fall, initial encounter: acute illness or injury  Musculoskeletal pain: acute illness or injury  Amount and/or Complexity of Data Reviewed  Radiology: ordered. Decision-making details documented in ED Course.          Final diagnoses:   Fall, initial encounter   Musculoskeletal pain       ED Disposition  ED Disposition     ED Disposition   Discharge    Condition   Stable    Comment   --             Subhash Gutierrez, DO  [address] [address]  [phone]    In 2 days           Medication List      No changes were made to your prescriptions during this visit.          Caren Marie PA-C  03/16/23 8592

## 2024-01-31 ENCOUNTER — TRANSCRIBE ORDERS (OUTPATIENT)
Dept: ADMINISTRATIVE | Facility: HOSPITAL | Age: 39
End: 2024-01-31
Payer: COMMERCIAL

## 2024-01-31 DIAGNOSIS — M54.42 LOW BACK PAIN WITH LEFT-SIDED SCIATICA, UNSPECIFIED BACK PAIN LATERALITY, UNSPECIFIED CHRONICITY: Primary | ICD-10-CM

## 2024-03-10 ENCOUNTER — HOSPITAL ENCOUNTER (EMERGENCY)
Facility: HOSPITAL | Age: 39
Discharge: HOME | End: 2024-03-10
Attending: EMERGENCY MEDICINE | Admitting: STUDENT IN AN ORGANIZED HEALTH CARE EDUCATION/TRAINING PROGRAM
Payer: COMMERCIAL

## 2024-03-10 ENCOUNTER — APPOINTMENT (OUTPATIENT)
Dept: GENERAL RADIOLOGY | Facility: HOSPITAL | Age: 39
End: 2024-03-10
Payer: COMMERCIAL

## 2024-03-10 VITALS
HEIGHT: 68 IN | OXYGEN SATURATION: 98 % | WEIGHT: 200 LBS | RESPIRATION RATE: 18 BRPM | DIASTOLIC BLOOD PRESSURE: 76 MMHG | TEMPERATURE: 98.5 F | HEART RATE: 66 BPM | BODY MASS INDEX: 30.31 KG/M2 | SYSTOLIC BLOOD PRESSURE: 115 MMHG

## 2024-03-10 DIAGNOSIS — R07.9 CHEST PAIN, UNSPECIFIED TYPE: Primary | ICD-10-CM

## 2024-03-10 LAB
ALBUMIN SERPL-MCNC: 4.2 G/DL (ref 3.5–5.2)
ALBUMIN/GLOB SERPL: 1.5 G/DL
ALP SERPL-CCNC: 103 U/L (ref 39–117)
ALT SERPL W P-5'-P-CCNC: 15 U/L (ref 1–41)
AMPHET+METHAMPHET UR QL: NEGATIVE
AMPHETAMINES UR QL: NEGATIVE
ANION GAP SERPL CALCULATED.3IONS-SCNC: 12 MMOL/L (ref 5–15)
AST SERPL-CCNC: 16 U/L (ref 1–40)
BARBITURATES UR QL SCN: NEGATIVE
BASOPHILS # BLD AUTO: 0.07 10*3/MM3 (ref 0–0.2)
BASOPHILS NFR BLD AUTO: 0.7 % (ref 0–1.5)
BENZODIAZ UR QL SCN: NEGATIVE
BILIRUB SERPL-MCNC: 0.2 MG/DL (ref 0–1.2)
BILIRUB UR QL STRIP: NEGATIVE
BUN SERPL-MCNC: 14 MG/DL (ref 6–20)
BUN/CREAT SERPL: 14.4 (ref 7–25)
BUPRENORPHINE SERPL-MCNC: NEGATIVE NG/ML
CALCIUM SPEC-SCNC: 8.8 MG/DL (ref 8.6–10.5)
CANNABINOIDS SERPL QL: NEGATIVE
CHLORIDE SERPL-SCNC: 105 MMOL/L (ref 98–107)
CLARITY UR: CLEAR
CO2 SERPL-SCNC: 21 MMOL/L (ref 22–29)
COCAINE UR QL: NEGATIVE
COLOR UR: YELLOW
CREAT SERPL-MCNC: 0.97 MG/DL (ref 0.76–1.27)
DEPRECATED RDW RBC AUTO: 46.2 FL (ref 37–54)
EGFRCR SERPLBLD CKD-EPI 2021: 103.1 ML/MIN/1.73
EOSINOPHIL # BLD AUTO: 0.16 10*3/MM3 (ref 0–0.4)
EOSINOPHIL NFR BLD AUTO: 1.6 % (ref 0.3–6.2)
ERYTHROCYTE [DISTWIDTH] IN BLOOD BY AUTOMATED COUNT: 14.4 % (ref 12.3–15.4)
FENTANYL UR-MCNC: NEGATIVE NG/ML
GEN 5 2HR TROPONIN T REFLEX: <6 NG/L
GLOBULIN UR ELPH-MCNC: 2.8 GM/DL
GLUCOSE SERPL-MCNC: 161 MG/DL (ref 65–99)
GLUCOSE UR STRIP-MCNC: NEGATIVE MG/DL
HCT VFR BLD AUTO: 41.4 % (ref 37.5–51)
HGB BLD-MCNC: 13.2 G/DL (ref 13–17.7)
HGB UR QL STRIP.AUTO: NEGATIVE
HOLD SPECIMEN: NORMAL
HOLD SPECIMEN: NORMAL
IMM GRANULOCYTES # BLD AUTO: 0.04 10*3/MM3 (ref 0–0.05)
IMM GRANULOCYTES NFR BLD AUTO: 0.4 % (ref 0–0.5)
KETONES UR QL STRIP: NEGATIVE
LEUKOCYTE ESTERASE UR QL STRIP.AUTO: NEGATIVE
LYMPHOCYTES # BLD AUTO: 1.72 10*3/MM3 (ref 0.7–3.1)
LYMPHOCYTES NFR BLD AUTO: 17.3 % (ref 19.6–45.3)
MAGNESIUM SERPL-MCNC: 1.9 MG/DL (ref 1.6–2.6)
MCH RBC QN AUTO: 27.8 PG (ref 26.6–33)
MCHC RBC AUTO-ENTMCNC: 31.9 G/DL (ref 31.5–35.7)
MCV RBC AUTO: 87.3 FL (ref 79–97)
METHADONE UR QL SCN: NEGATIVE
MONOCYTES # BLD AUTO: 0.66 10*3/MM3 (ref 0.1–0.9)
MONOCYTES NFR BLD AUTO: 6.6 % (ref 5–12)
NEUTROPHILS NFR BLD AUTO: 7.31 10*3/MM3 (ref 1.7–7)
NEUTROPHILS NFR BLD AUTO: 73.4 % (ref 42.7–76)
NITRITE UR QL STRIP: NEGATIVE
NRBC BLD AUTO-RTO: 0 /100 WBC (ref 0–0.2)
OPIATES UR QL: NEGATIVE
OXYCODONE UR QL SCN: NEGATIVE
PCP UR QL SCN: NEGATIVE
PH UR STRIP.AUTO: 6.5 [PH] (ref 5–8)
PLATELET # BLD AUTO: 366 10*3/MM3 (ref 140–450)
PMV BLD AUTO: 9.6 FL (ref 6–12)
POTASSIUM SERPL-SCNC: 3.5 MMOL/L (ref 3.5–5.2)
PROT SERPL-MCNC: 7 G/DL (ref 6–8.5)
PROT UR QL STRIP: NEGATIVE
RBC # BLD AUTO: 4.74 10*6/MM3 (ref 4.14–5.8)
SODIUM SERPL-SCNC: 138 MMOL/L (ref 136–145)
SP GR UR STRIP: 1.02 (ref 1–1.03)
TRICYCLICS UR QL SCN: NEGATIVE
TROPONIN T DELTA: NORMAL
TROPONIN T SERPL HS-MCNC: <6 NG/L
UROBILINOGEN UR QL STRIP: NORMAL
WBC NRBC COR # BLD AUTO: 9.96 10*3/MM3 (ref 3.4–10.8)
WHOLE BLOOD HOLD COAG: NORMAL
WHOLE BLOOD HOLD SPECIMEN: NORMAL

## 2024-03-10 PROCEDURE — 71045 X-RAY EXAM CHEST 1 VIEW: CPT

## 2024-03-10 PROCEDURE — 93005 ELECTROCARDIOGRAM TRACING: CPT | Performed by: EMERGENCY MEDICINE

## 2024-03-10 PROCEDURE — 83735 ASSAY OF MAGNESIUM: CPT | Performed by: EMERGENCY MEDICINE

## 2024-03-10 PROCEDURE — 81003 URINALYSIS AUTO W/O SCOPE: CPT | Performed by: EMERGENCY MEDICINE

## 2024-03-10 PROCEDURE — 99284 EMERGENCY DEPT VISIT MOD MDM: CPT

## 2024-03-10 PROCEDURE — 85025 COMPLETE CBC W/AUTO DIFF WBC: CPT | Performed by: EMERGENCY MEDICINE

## 2024-03-10 PROCEDURE — 84484 ASSAY OF TROPONIN QUANT: CPT | Performed by: EMERGENCY MEDICINE

## 2024-03-10 PROCEDURE — 71045 X-RAY EXAM CHEST 1 VIEW: CPT | Performed by: RADIOLOGY

## 2024-03-10 PROCEDURE — 36415 COLL VENOUS BLD VENIPUNCTURE: CPT

## 2024-03-10 PROCEDURE — 80307 DRUG TEST PRSMV CHEM ANLYZR: CPT | Performed by: EMERGENCY MEDICINE

## 2024-03-10 PROCEDURE — 80053 COMPREHEN METABOLIC PANEL: CPT | Performed by: EMERGENCY MEDICINE

## 2024-03-10 PROCEDURE — 93010 ELECTROCARDIOGRAM REPORT: CPT | Performed by: SPECIALIST

## 2024-03-10 RX ORDER — SODIUM CHLORIDE 0.9 % (FLUSH) 0.9 %
10 SYRINGE (ML) INJECTION AS NEEDED
Status: DISCONTINUED | OUTPATIENT
Start: 2024-03-10 | End: 2024-03-11 | Stop reason: HOSPADM

## 2024-03-10 RX ORDER — ASPIRIN 325 MG
325 TABLET ORAL ONCE
Status: DISCONTINUED | OUTPATIENT
Start: 2024-03-10 | End: 2024-03-10

## 2024-03-10 RX ORDER — ACETAMINOPHEN 500 MG
1000 TABLET ORAL ONCE
Status: COMPLETED | OUTPATIENT
Start: 2024-03-10 | End: 2024-03-10

## 2024-03-10 RX ADMIN — NITROGLYCERIN 1 INCH: 20 OINTMENT TOPICAL at 19:56

## 2024-03-10 RX ADMIN — ACETAMINOPHEN 1000 MG: 500 TABLET ORAL at 20:54

## 2024-03-10 NOTE — ED PROVIDER NOTES
Subjective   History of Present Illness  38-year-old white transgender male here today for chest pain.  He reports today he was at a family gathering with his in-laws when he began to have left-sided chest pain radiating to the center of his chest and into his left shoulder.  He had shortness of breath and nausea, but denied any diaphoresis.  He went to find his wife in another room and told her that he wanted to go home.  When he felt worse, they came to the ED.  They report that yesterday he had an episode where he started feeling bad, was standing at his kitchen island with his hand and his hands and had an episode of syncope lasting for less than a minute.  He had 1 episode of arching his back but no rhythmic activity.  No tongue biting or incontinence.  He has not had any previous history of seizures.  He denies any other complaints.  He has a history of anxiety and depression but denies any increased anxiety.        Review of Systems   All other systems reviewed and are negative.      Past Medical History:   Diagnosis Date    Anxiety     Asthma     Depression        No Known Allergies    No past surgical history on file.    Family History   Problem Relation Age of Onset    Lung disease Mother     Heart disease Father     Autoimmune disease Other     Diabetes Other        Social History     Socioeconomic History    Marital status: Single   Tobacco Use    Smoking status: Never    Smokeless tobacco: Current   Substance and Sexual Activity    Alcohol use: No    Drug use: No           Objective   Physical Exam  Vitals and nursing note reviewed. Exam conducted with a chaperone present.   Constitutional:       Appearance: Normal appearance. He is normal weight.   HENT:      Head: Normocephalic and atraumatic.   Cardiovascular:      Rate and Rhythm: Normal rate and regular rhythm.      Heart sounds: Normal heart sounds. No murmur heard.     No friction rub. No gallop.   Pulmonary:      Effort: Pulmonary effort is  normal. No respiratory distress.      Breath sounds: Normal breath sounds. No wheezing, rhonchi or rales.   Chest:      Chest wall: No tenderness.   Breasts:     Left: Tenderness present.      Comments: Sternum and costochondral junctions bilaterally.  Abdominal:      General: Abdomen is flat. Bowel sounds are normal. There is no distension.      Palpations: Abdomen is soft.      Tenderness: There is no abdominal tenderness.   Musculoskeletal:         General: Normal range of motion.      Right lower leg: No edema.      Left lower leg: No edema.   Skin:     General: Skin is warm and dry.   Neurological:      General: No focal deficit present.      Mental Status: He is alert and oriented to person, place, and time.   Psychiatric:         Mood and Affect: Mood normal.         Behavior: Behavior normal.         Procedures           ED Course  ED Course as of 03/10/24 2151   Sun Mar 10, 2024   1806 ECG 12 Lead Chest Pain  Normal sinus rhythm.  Rate 75.  Normal axis.  QTc 460.  Nonspecific T wave changes.  No ST elevation or depression.  Abnormal EKG.  Interpreted by me.  Electronically signed by Zaheer Stoner MD, 03/10/24, 6:07 PM EDT.   [BC]   1920 Patient care transferred to Dr. Luke at change of shift.    Zaheer Stoner MD  7:20 PM EDT   [BC]   1931 Took over care of patient from Dr. Stoner pending repeat troponin, urine studies, and reevaluation  Electronically signed by Yevgeniy Luke MD, 03/10/24, 7:31 PM EDT.   [AS]   2150 Troponin and urine showed no acute findings.  Patient well-appearing on reevaluation.  Discharged in no acute distress with normal vitals and primary care follow-up. [AS]      ED Course User Index  [AS] Yevgeniy Luke MD  [BC] Zaheer Stoner MD                HEART Score: 2                              Medical Decision Making  Problems Addressed:  Chest pain, unspecified type: complicated acute illness or injury    Amount and/or Complexity of Data Reviewed  Labs: ordered.  Radiology:  ordered.  ECG/medicine tests: ordered. Decision-making details documented in ED Course.    Risk  OTC drugs.  Prescription drug management.        Final diagnoses:   Chest pain, unspecified type       ED Disposition  ED Disposition       ED Disposition   Discharge    Condition   Stable    Comment   --               No follow-up provider specified.       Medication List      No changes were made to your prescriptions during this visit.            Yevgeniy Luke MD  03/10/24 7375

## 2024-03-11 LAB
QT INTERVAL: 412 MS
QTC INTERVAL: 460 MS

## 2024-03-13 ENCOUNTER — PATIENT OUTREACH (OUTPATIENT)
Dept: CASE MANAGEMENT | Facility: OTHER | Age: 39
End: 2024-03-13
Payer: COMMERCIAL

## 2024-03-13 NOTE — OUTREACH NOTE
Adult Patient Profile  Questions/Answers      Flowsheet Row Most Recent Value   Symptoms/Conditions Managed at Home cardiovascular   Barriers to Managing Health none   Cardiovascular Symptoms/Conditions chest pain, hypertension   Cardiovascular Management Strategies medication therapy, routine screening   Cardiovascular Self-Management Outcome 3 (uncertain)   Missed Doses of Prescribed Medications During Past Week no   Taken Prescribed Medications at Different Time or Schedule During Past Week no   Taken More or Less Medication Than Prescribed no   Barriers to Taking Medication as Prescribed none   How to be Addressed Steve   How Well Do You Speak English? very well        Adult Wellness Assessment  Questions/Answers      Flowsheet Row Most Recent Value   How often do you have someone help you read facts about your health? never   How often do you have trouble learning about your health because you don't know what the written words mean? never   How confident are you filling out forms by yourself? always        Send Education  Questions/Answers      Flowsheet Row Most Recent Value   Other Patient Education/Resources  24/7 [facility] Nurse Call Line   24/7 Nurse Call Line Education Method Verbal        AMBULATORY CASE MANAGEMENT NOTE    Name and Relationship of Patient/Support Person: Steve Davidson - Self  Self        Education Documentation  Provider Follow-Up, taught by Indy Babin, RN at 3/13/2024  1:50 PM.  Learner: Patient  Readiness: Acceptance  Method: Explanation  Response: Verbalizes Understanding    Blood Pressure Monitoring, taught by Indy Babin, JERAMIE at 3/13/2024  1:50 PM.  Learner: Patient  Readiness: Acceptance  Method: Explanation  Response: Verbalizes Understanding      Patient Outreach    Call to patient for f/u after ED visit for chest pain.Patient states he still continues to have some chest discomfort and weakness. He does have a PMH of htn, last Lipids show . He does monitor BP at  home and yesterday was 120/76 but has been in 160's recetnly. He takes Losartan 50mg daily. Due to functional movement issues he is not able to exercise. Takes with bene about making f/u appt with PCP d/t continued symptoms. Discussed diet for healthy heart and low sodium. Encouraged to continue to monitor BP and record for MD to review and when to seek emergent treatment. No SDOH deficits noted at this time. Agrees to continue f/u.     HERSON GUZMAN  Ambulatory Case Management    3/13/2024, 14:12 EDT

## 2024-08-19 ENCOUNTER — HOSPITAL ENCOUNTER (EMERGENCY)
Facility: HOSPITAL | Age: 39
Discharge: HOME | End: 2024-08-19
Attending: STUDENT IN AN ORGANIZED HEALTH CARE EDUCATION/TRAINING PROGRAM | Admitting: EMERGENCY MEDICINE
Payer: COMMERCIAL

## 2024-08-19 VITALS
HEIGHT: 68 IN | TEMPERATURE: 97.6 F | SYSTOLIC BLOOD PRESSURE: 131 MMHG | BODY MASS INDEX: 30.31 KG/M2 | HEART RATE: 88 BPM | DIASTOLIC BLOOD PRESSURE: 92 MMHG | WEIGHT: 200 LBS | OXYGEN SATURATION: 97 % | RESPIRATION RATE: 20 BRPM

## 2024-08-19 DIAGNOSIS — S61.512A LACERATION OF LEFT WRIST, INITIAL ENCOUNTER: Primary | ICD-10-CM

## 2024-08-19 PROCEDURE — 99282 EMERGENCY DEPT VISIT SF MDM: CPT

## 2024-08-19 RX ADMIN — Medication 3 ML: at 17:48

## 2024-08-19 NOTE — DISCHARGE INSTRUCTIONS
Call one of the offices below to establish a primary care provider.  If you are unable to get an appointment and feel it is an emergency and need to be seen immediately please return to the Emergency Department    Call one of the officee below to set up a primary care provider.       Dr. Larson  [address]  [place] [id number] [phone]    [facility] ([facility])  [address]  [facility] [address] [phone]    [facility] ([place])                                                                                                       [address] [facility] [address] [facility] [id number] [phone]    [facility])  [address]   [address]  [place] [address]  [phone]    [facility])  [address]  [place] [address]  Phone: [phone]    [facility]  [address] [place] [id number] [phone]    [facility])  [address] [place] [address]  Phone: [phone]    Dr. Lynsey Faith  [address]  [facility] [id number] [phone]    [place] [address]  [phone]    [facility]  Nedra Garcias, APRN  [address] [place] [address]  [phone]    Dr. Rose and [facility]   [address]  [address]  [place] [address]  [phone]

## 2024-08-20 NOTE — ED PROVIDER NOTES
Subjective   History of Present Illness  Patient is a 38-year-old male with no significant past medical history.  Patient presents with complaints of a laceration to his left wrist area.  Patient reports that he was using a  when it slipped and cut his left wrist.  Patient has a approximately 2 cm laceration to the dorsal surface of his left hand extending into his wrist.  Approximately 1 cm of the laceration is very superficial and 1 cm of the laceration is slightly deeper.  However the all bleeding is controlled at the time of arrival.  Patient reports his last tetanus shot was approximately 1 year ago.  Patient is alert and oriented able to answer questions appropriately.  Patient presents private vehicle with his girlfriend at bedside.        Review of Systems   Constitutional: Negative.  Negative for fever.   HENT: Negative.     Respiratory: Negative.     Cardiovascular: Negative.  Negative for chest pain.   Gastrointestinal: Negative.  Negative for abdominal pain.   Endocrine: Negative.    Genitourinary: Negative.  Negative for dysuria.   Skin:  Positive for wound.   Neurological: Negative.    Psychiatric/Behavioral: Negative.     All other systems reviewed and are negative.      Past Medical History:   Diagnosis Date    Anxiety     Asthma     Depression        No Known Allergies    No past surgical history on file.    Family History   Problem Relation Age of Onset    Lung disease Mother     Heart disease Father     Autoimmune disease Other     Diabetes Other        Social History     Socioeconomic History    Marital status: Single   Tobacco Use    Smoking status: Never    Smokeless tobacco: Current   Substance and Sexual Activity    Alcohol use: No    Drug use: No           Objective   Physical Exam  Vitals and nursing note reviewed.   Constitutional:       General: He is not in acute distress.     Appearance: He is well-developed. He is not diaphoretic.   HENT:      Head: Normocephalic and atraumatic.       Right Ear: External ear normal.      Left Ear: External ear normal.      Nose: Nose normal.   Eyes:      Conjunctiva/sclera: Conjunctivae normal.      Pupils: Pupils are equal, round, and reactive to light.   Neck:      Vascular: No JVD.      Trachea: No tracheal deviation.   Cardiovascular:      Rate and Rhythm: Normal rate and regular rhythm.      Heart sounds: Normal heart sounds. No murmur heard.  Pulmonary:      Effort: Pulmonary effort is normal. No respiratory distress.      Breath sounds: Normal breath sounds. No wheezing.   Abdominal:      General: Bowel sounds are normal.      Palpations: Abdomen is soft.      Tenderness: There is no abdominal tenderness.   Musculoskeletal:         General: No deformity. Normal range of motion.      Cervical back: Normal range of motion and neck supple.   Skin:     General: Skin is warm and dry.      Coloration: Skin is not pale.      Findings: No erythema or rash.      Comments: approximately 2 cm laceration to the dorsal surface of his left hand extending into his wrist.  Approximately 1 cm of the laceration is very superficial and 1 cm of the laceration is slightly deeper.     Neurological:      Mental Status: He is alert and oriented to person, place, and time.      Cranial Nerves: No cranial nerve deficit.   Psychiatric:         Behavior: Behavior normal.         Thought Content: Thought content normal.         Laceration Repair    Date/Time: 8/19/2024 10:51 PM    Performed by: Doris Kendrick APRN  Authorized by: Elías Benson MD    Consent:     Consent obtained:  Verbal    Consent given by:  Patient    Risks, benefits, and alternatives were discussed: yes      Risks discussed:  Pain and infection    Alternatives discussed:  No treatment, delayed treatment and observation  Universal protocol:     Procedure explained and questions answered to patient or proxy's satisfaction: yes      Patient identity confirmed:  Verbally with patient  Anesthesia:      Anesthesia method:  Topical application    Topical anesthetic:  LET  Laceration details:     Location:  Shoulder/arm    Shoulder/arm location:  L lower arm    Length (cm):  2  Pre-procedure details:     Preparation:  Patient was prepped and draped in usual sterile fashion  Treatment:     Area cleansed with:  Chlorhexidine    Amount of cleaning:  Standard  Skin repair:     Repair method:  Sutures    Suture size:  5-0    Suture material:  Nylon    Suture technique:  Simple interrupted    Number of sutures:  2  Approximation:     Approximation:  Close  Repair type:     Repair type:  Simple  Post-procedure details:     Dressing:  Non-adherent dressing    Procedure completion:  Tolerated             ED Course                                             Medical Decision Making  Patient is a 38-year-old male with no significant past medical history.  Patient presents with complaints of a laceration to his left wrist area.  Patient reports that he was using a  when it slipped and cut his left wrist.  Patient has a approximately 2 cm laceration to the dorsal surface of his left hand extending into his wrist.  Approximately 1 cm of the laceration is very superficial and 1 cm of the laceration is slightly deeper.  However the all bleeding is controlled at the time of arrival.  Patient reports his last tetanus shot was approximately 1 year ago.  Patient is alert and oriented able to answer questions appropriately.  Patient presents private vehicle with his girlfriend at bedside.    Problems Addressed:  Laceration of left wrist, initial encounter: complicated acute illness or injury    Risk  Prescription drug management.        Final diagnoses:   Laceration of left wrist, initial encounter       ED Disposition  ED Disposition       ED Disposition   Discharge    Condition   Stable    Comment   --               [facility] EMERGENCY DEPARTMENT  [address] [place] [id number]  [phone]  Go to   If  symptoms worsen         Medication List      No changes were made to your prescriptions during this visit.            Doris Kendrick, APRN  08/19/24 [id number]

## 2024-11-12 ENCOUNTER — TRANSCRIBE ORDERS (OUTPATIENT)
Dept: ADMINISTRATIVE | Facility: HOSPITAL | Age: 39
End: 2024-11-12
Payer: COMMERCIAL

## 2024-11-12 DIAGNOSIS — R10.13 EPIGASTRIC ABDOMINAL PAIN: Primary | ICD-10-CM

## 2024-11-26 ENCOUNTER — HOSPITAL ENCOUNTER (OUTPATIENT)
Dept: ULTRASOUND IMAGING | Facility: HOSPITAL | Age: 39
Discharge: HOME | End: 2024-11-26
Admitting: PHYSICIAN ASSISTANT
Payer: COMMERCIAL

## 2024-11-26 DIAGNOSIS — R10.13 EPIGASTRIC ABDOMINAL PAIN: ICD-10-CM

## 2024-11-26 PROCEDURE — 76705 ECHO EXAM OF ABDOMEN: CPT

## 2024-12-02 ENCOUNTER — TRANSCRIBE ORDERS (OUTPATIENT)
Dept: ADMINISTRATIVE | Facility: HOSPITAL | Age: 39
End: 2024-12-02
Payer: COMMERCIAL

## 2024-12-02 DIAGNOSIS — R19.7 DIARRHEA, UNSPECIFIED TYPE: ICD-10-CM

## 2024-12-02 DIAGNOSIS — R11.2 NAUSEA AND VOMITING, UNSPECIFIED VOMITING TYPE: ICD-10-CM

## 2024-12-02 DIAGNOSIS — R10.13 ABDOMINAL PAIN, EPIGASTRIC: Primary | ICD-10-CM

## 2025-01-20 PROBLEM — Z23 RABIES, NEED FOR PROPHYLACTIC VACCINATION AGAINST: Status: ACTIVE | Noted: 2025-01-20

## 2025-01-29 ENCOUNTER — INFUSION (OUTPATIENT)
Dept: ONCOLOGY | Facility: HOSPITAL | Age: 40
End: 2025-01-29
Payer: COMMERCIAL

## 2025-01-29 VITALS
BODY MASS INDEX: 29.89 KG/M2 | RESPIRATION RATE: 18 BRPM | SYSTOLIC BLOOD PRESSURE: 148 MMHG | WEIGHT: 196.6 LBS | TEMPERATURE: 97.5 F | OXYGEN SATURATION: 99 % | HEART RATE: 73 BPM | DIASTOLIC BLOOD PRESSURE: 89 MMHG

## 2025-01-29 DIAGNOSIS — Z23 RABIES, NEED FOR PROPHYLACTIC VACCINATION AGAINST: Primary | ICD-10-CM

## 2025-01-29 PROCEDURE — 90675 RABIES VACCINE IM: CPT | Performed by: NURSE PRACTITIONER

## 2025-01-29 PROCEDURE — 25010000002 RABIES VACCINE PER 1 ML: Performed by: NURSE PRACTITIONER

## 2025-01-29 PROCEDURE — 90471 IMMUNIZATION ADMIN: CPT | Performed by: NURSE PRACTITIONER

## 2025-01-29 PROCEDURE — 96372 THER/PROPH/DIAG INJ SC/IM: CPT

## 2025-01-29 RX ADMIN — RABIES VACCINE 2.5 UNITS: KIT at 09:24

## 2025-02-04 ENCOUNTER — INFUSION (OUTPATIENT)
Dept: ONCOLOGY | Facility: HOSPITAL | Age: 40
End: 2025-02-04
Payer: COMMERCIAL

## 2025-02-04 VITALS
DIASTOLIC BLOOD PRESSURE: 85 MMHG | BODY MASS INDEX: 29.38 KG/M2 | TEMPERATURE: 98.3 F | OXYGEN SATURATION: 97 % | HEART RATE: 91 BPM | SYSTOLIC BLOOD PRESSURE: 128 MMHG | RESPIRATION RATE: 16 BRPM | WEIGHT: 193.2 LBS

## 2025-02-04 DIAGNOSIS — Z23 RABIES, NEED FOR PROPHYLACTIC VACCINATION AGAINST: Primary | ICD-10-CM

## 2025-02-04 PROCEDURE — 90471 IMMUNIZATION ADMIN: CPT | Performed by: NURSE PRACTITIONER

## 2025-02-04 PROCEDURE — 96372 THER/PROPH/DIAG INJ SC/IM: CPT

## 2025-02-04 PROCEDURE — 25010000002 RABIES VACCINE PER 1 ML: Performed by: NURSE PRACTITIONER

## 2025-02-04 PROCEDURE — 90675 RABIES VACCINE IM: CPT | Performed by: NURSE PRACTITIONER

## 2025-02-04 RX ADMIN — RABIES VACCINE 2.5 UNITS: KIT at 15:43
